# Patient Record
Sex: MALE | Race: WHITE | NOT HISPANIC OR LATINO | URBAN - METROPOLITAN AREA
[De-identification: names, ages, dates, MRNs, and addresses within clinical notes are randomized per-mention and may not be internally consistent; named-entity substitution may affect disease eponyms.]

---

## 2024-03-10 ENCOUNTER — EMERGENCY (EMERGENCY)
Facility: HOSPITAL | Age: 74
LOS: 1 days | Discharge: HOME | End: 2024-03-10
Attending: EMERGENCY MEDICINE | Admitting: EMERGENCY MEDICINE
Payer: MEDICARE

## 2024-03-10 VITALS
HEIGHT: 67 IN | HEART RATE: 78 BPM | TEMPERATURE: 98 F | DIASTOLIC BLOOD PRESSURE: 103 MMHG | SYSTOLIC BLOOD PRESSURE: 194 MMHG | OXYGEN SATURATION: 97 % | WEIGHT: 224.87 LBS | RESPIRATION RATE: 18 BRPM

## 2024-03-10 LAB
ALBUMIN SERPL ELPH-MCNC: 3.5 G/DL — SIGNIFICANT CHANGE UP (ref 3.3–5)
ALP SERPL-CCNC: 47 U/L — SIGNIFICANT CHANGE UP (ref 40–120)
ALT FLD-CCNC: 27 U/L — SIGNIFICANT CHANGE UP (ref 12–78)
ANION GAP SERPL CALC-SCNC: 7 MMOL/L — SIGNIFICANT CHANGE UP (ref 5–17)
AST SERPL-CCNC: 20 U/L — SIGNIFICANT CHANGE UP (ref 15–37)
BASOPHILS # BLD AUTO: 0.03 K/UL — SIGNIFICANT CHANGE UP (ref 0–0.2)
BASOPHILS NFR BLD AUTO: 0.4 % — SIGNIFICANT CHANGE UP (ref 0–2)
BILIRUB SERPL-MCNC: 0.4 MG/DL — SIGNIFICANT CHANGE UP (ref 0.2–1.2)
BUN SERPL-MCNC: 16 MG/DL — SIGNIFICANT CHANGE UP (ref 7–23)
CALCIUM SERPL-MCNC: 9.3 MG/DL — SIGNIFICANT CHANGE UP (ref 8.5–10.1)
CHLORIDE SERPL-SCNC: 112 MMOL/L — HIGH (ref 96–108)
CO2 SERPL-SCNC: 28 MMOL/L — SIGNIFICANT CHANGE UP (ref 22–31)
CREAT SERPL-MCNC: 1.3 MG/DL — SIGNIFICANT CHANGE UP (ref 0.5–1.3)
D DIMER BLD IA.RAPID-MCNC: 303 NG/ML DDU — HIGH
EGFR: 57 ML/MIN/1.73M2 — LOW
EOSINOPHIL # BLD AUTO: 0.19 K/UL — SIGNIFICANT CHANGE UP (ref 0–0.5)
EOSINOPHIL NFR BLD AUTO: 2.3 % — SIGNIFICANT CHANGE UP (ref 0–6)
GLUCOSE SERPL-MCNC: 138 MG/DL — HIGH (ref 70–99)
HCT VFR BLD CALC: 48.3 % — SIGNIFICANT CHANGE UP (ref 39–50)
HGB BLD-MCNC: 15.8 G/DL — SIGNIFICANT CHANGE UP (ref 13–17)
IMM GRANULOCYTES NFR BLD AUTO: 0.2 % — SIGNIFICANT CHANGE UP (ref 0–0.9)
LIDOCAIN IGE QN: 90 U/L — HIGH (ref 13–75)
LYMPHOCYTES # BLD AUTO: 2.81 K/UL — SIGNIFICANT CHANGE UP (ref 1–3.3)
LYMPHOCYTES # BLD AUTO: 34 % — SIGNIFICANT CHANGE UP (ref 13–44)
MAGNESIUM SERPL-MCNC: 2.1 MG/DL — SIGNIFICANT CHANGE UP (ref 1.6–2.6)
MCHC RBC-ENTMCNC: 28.7 PG — SIGNIFICANT CHANGE UP (ref 27–34)
MCHC RBC-ENTMCNC: 32.7 GM/DL — SIGNIFICANT CHANGE UP (ref 32–36)
MCV RBC AUTO: 87.7 FL — SIGNIFICANT CHANGE UP (ref 80–100)
MONOCYTES # BLD AUTO: 0.5 K/UL — SIGNIFICANT CHANGE UP (ref 0–0.9)
MONOCYTES NFR BLD AUTO: 6.1 % — SIGNIFICANT CHANGE UP (ref 2–14)
NEUTROPHILS # BLD AUTO: 4.71 K/UL — SIGNIFICANT CHANGE UP (ref 1.8–7.4)
NEUTROPHILS NFR BLD AUTO: 57 % — SIGNIFICANT CHANGE UP (ref 43–77)
NRBC # BLD: 0 /100 WBCS — SIGNIFICANT CHANGE UP (ref 0–0)
NT-PROBNP SERPL-SCNC: 85 PG/ML — SIGNIFICANT CHANGE UP (ref 0–450)
PLATELET # BLD AUTO: 251 K/UL — SIGNIFICANT CHANGE UP (ref 150–400)
POTASSIUM SERPL-MCNC: 3.8 MMOL/L — SIGNIFICANT CHANGE UP (ref 3.5–5.3)
POTASSIUM SERPL-SCNC: 3.8 MMOL/L — SIGNIFICANT CHANGE UP (ref 3.5–5.3)
PROT SERPL-MCNC: 7.2 G/DL — SIGNIFICANT CHANGE UP (ref 6–8.3)
RBC # BLD: 5.51 M/UL — SIGNIFICANT CHANGE UP (ref 4.2–5.8)
RBC # FLD: 13.9 % — SIGNIFICANT CHANGE UP (ref 10.3–14.5)
SODIUM SERPL-SCNC: 147 MMOL/L — HIGH (ref 135–145)
TROPONIN I, HIGH SENSITIVITY RESULT: 9.3 NG/L — SIGNIFICANT CHANGE UP
WBC # BLD: 8.26 K/UL — SIGNIFICANT CHANGE UP (ref 3.8–10.5)
WBC # FLD AUTO: 8.26 K/UL — SIGNIFICANT CHANGE UP (ref 3.8–10.5)

## 2024-03-10 PROCEDURE — 71275 CT ANGIOGRAPHY CHEST: CPT | Mod: 26,MC

## 2024-03-10 PROCEDURE — 71045 X-RAY EXAM CHEST 1 VIEW: CPT | Mod: 26

## 2024-03-10 PROCEDURE — 93010 ELECTROCARDIOGRAM REPORT: CPT

## 2024-03-10 PROCEDURE — 99285 EMERGENCY DEPT VISIT HI MDM: CPT

## 2024-03-10 PROCEDURE — 74177 CT ABD & PELVIS W/CONTRAST: CPT | Mod: 26,MC

## 2024-03-10 RX ORDER — DIPHENHYDRAMINE HCL 50 MG
50 CAPSULE ORAL ONCE
Refills: 0 | Status: COMPLETED | OUTPATIENT
Start: 2024-03-10 | End: 2024-03-10

## 2024-03-10 RX ORDER — ONDANSETRON 8 MG/1
4 TABLET, FILM COATED ORAL ONCE
Refills: 0 | Status: COMPLETED | OUTPATIENT
Start: 2024-03-10 | End: 2024-03-10

## 2024-03-10 RX ORDER — NITROGLYCERIN 6.5 MG
0.4 CAPSULE, EXTENDED RELEASE ORAL ONCE
Refills: 0 | Status: COMPLETED | OUTPATIENT
Start: 2024-03-10 | End: 2024-03-10

## 2024-03-10 RX ORDER — ASPIRIN/CALCIUM CARB/MAGNESIUM 324 MG
324 TABLET ORAL ONCE
Refills: 0 | Status: COMPLETED | OUTPATIENT
Start: 2024-03-10 | End: 2024-03-10

## 2024-03-10 RX ORDER — SODIUM CHLORIDE 9 MG/ML
1000 INJECTION INTRAMUSCULAR; INTRAVENOUS; SUBCUTANEOUS ONCE
Refills: 0 | Status: COMPLETED | OUTPATIENT
Start: 2024-03-10 | End: 2024-03-10

## 2024-03-10 RX ORDER — MORPHINE SULFATE 50 MG/1
2 CAPSULE, EXTENDED RELEASE ORAL ONCE
Refills: 0 | Status: DISCONTINUED | OUTPATIENT
Start: 2024-03-10 | End: 2024-03-10

## 2024-03-10 RX ORDER — ACETAMINOPHEN 500 MG
1000 TABLET ORAL ONCE
Refills: 0 | Status: COMPLETED | OUTPATIENT
Start: 2024-03-10 | End: 2024-03-10

## 2024-03-10 RX ADMIN — Medication 1000 MILLIGRAM(S): at 22:35

## 2024-03-10 RX ADMIN — Medication 50 MILLIGRAM(S): at 23:14

## 2024-03-10 RX ADMIN — Medication 125 MILLIGRAM(S): at 23:13

## 2024-03-10 RX ADMIN — Medication 0.4 MILLIGRAM(S): at 21:24

## 2024-03-10 RX ADMIN — ONDANSETRON 4 MILLIGRAM(S): 8 TABLET, FILM COATED ORAL at 21:43

## 2024-03-10 RX ADMIN — MORPHINE SULFATE 2 MILLIGRAM(S): 50 CAPSULE, EXTENDED RELEASE ORAL at 21:43

## 2024-03-10 RX ADMIN — SODIUM CHLORIDE 1000 MILLILITER(S): 9 INJECTION INTRAMUSCULAR; INTRAVENOUS; SUBCUTANEOUS at 23:14

## 2024-03-10 RX ADMIN — Medication 400 MILLIGRAM(S): at 22:20

## 2024-03-10 RX ADMIN — Medication 1000 MILLIGRAM(S): at 22:50

## 2024-03-10 RX ADMIN — Medication 324 MILLIGRAM(S): at 21:24

## 2024-03-10 RX ADMIN — MORPHINE SULFATE 2 MILLIGRAM(S): 50 CAPSULE, EXTENDED RELEASE ORAL at 22:13

## 2024-03-10 RX ADMIN — SODIUM CHLORIDE 1000 MILLILITER(S): 9 INJECTION INTRAMUSCULAR; INTRAVENOUS; SUBCUTANEOUS at 22:20

## 2024-03-10 NOTE — ED ADULT NURSE NOTE - OBJECTIVE STATEMENT
Pt p[resents to the ED s/p chest pressure, EKG done, pt placed on cardiac monitor, continuous pulse ox.

## 2024-03-10 NOTE — ED ADULT NURSE NOTE - CINV DISCH MEDS REVIEWED YN
Patient is having Chest Congestion Really bad he wants you to call in antibiotic for this. He is unable to come in due to work.  His no is [phone] Yes

## 2024-03-10 NOTE — ED PROVIDER NOTE - OBJECTIVE STATEMENT
74 yo M with hx of HTN presents c/o substernal chest pain x 2 hours. States the pain is non-radiating and steady. Pt reports he was laying down when the pain started. Pt did not take anything to alleviate the pain. Denies SOB. Denies prior hx of MI or known CAD. Denies N/V or abdominal pain. Denies LE pain or swelling.     Dr. Maldonado is his cardiologist.

## 2024-03-10 NOTE — ED ADULT NURSE NOTE - CADM POA PRESS ULCER
OB Provider reported that the vagina was inspected and no foreign body was found/Laps, needles and instrument count was correct No

## 2024-03-10 NOTE — ED PROVIDER NOTE - PATIENT PORTAL LINK FT
You can access the FollowMyHealth Patient Portal offered by [facility] by registering at the following website: [email or web address]. By joining [employer]’s FollowMyHealth portal, you will also be able to view your health information using other applications (apps) compatible with our system.

## 2024-03-10 NOTE — ED PROVIDER NOTE - PROGRESS NOTE DETAILS
Pt had small episode of vomiting. States pain is only a little better  SBP is better to 160s  Morphine and zofran ordered.  Will reassess. Labs reveiwed. Trop neg  Dimer elevated, CTA ordered  Pt re-evaluated, sitting up, looks better  States he feels better  Explained results to pt Pt returned from CT, c/o itching on head. Pt noted with hives on head and face. Denies tongue/lip swelling or SOB. Oropharynx is clear. Pt reports he has had IV contrast before without any adverse reaction. Will give benadryl and solumedrol and observe. Pt re-evalauted, states feels better  CTA results reviewed, no PE or dissection  Rpt trop pending  Pt reports he is a former smoker, smoked for 30 yrs but quit a long time ago  Will hold for cardio eval in AM Pt signed out to Dr. Gomes to follow cardio consult pt seen and cleared by cardio, follow up with dr velarde as outpt, return if any symptoms worsen

## 2024-03-10 NOTE — ED ADULT NURSE REASSESSMENT NOTE - NS ED NURSE REASSESS COMMENT FT1
2305: Pt returned back from the Ct scan with hives on his head and forehead c/o itching., Dr. Terry aware. pt denies difficulty breathing.

## 2024-03-10 NOTE — ED ADULT NURSE NOTE - NSFALLUNIVINTERV_ED_ALL_ED
Bed/Stretcher in lowest position, wheels locked, appropriate side rails in place/Call bell, personal items and telephone in reach/Instruct patient to call for assistance before getting out of bed/chair/stretcher/Non-slip footwear applied when patient is off stretcher/[place] to call system/Physically safe environment - no spills, clutter or unnecessary equipment/Purposeful proactive rounding/Room/bathroom lighting operational, light cord in reach

## 2024-03-10 NOTE — ED PROVIDER NOTE - CLINICAL SUMMARY MEDICAL DECISION MAKING FREE TEXT BOX
74 yo M with CP x 2 hours. Will evaluate for ACS, PE, PTX. Will get labs, EKG, CXR. Will give ASA 34 and SL NTG. Low suspicion for dissection as no radiation of  pain. Will closely monitor and reassess.

## 2024-03-11 VITALS
DIASTOLIC BLOOD PRESSURE: 60 MMHG | SYSTOLIC BLOOD PRESSURE: 130 MMHG | HEART RATE: 74 BPM | OXYGEN SATURATION: 98 % | RESPIRATION RATE: 16 BRPM

## 2024-03-11 LAB — TROPONIN I, HIGH SENSITIVITY RESULT: 10.1 NG/L — SIGNIFICANT CHANGE UP

## 2024-03-11 PROCEDURE — 71275 CT ANGIOGRAPHY CHEST: CPT | Mod: MC

## 2024-03-11 PROCEDURE — 93005 ELECTROCARDIOGRAM TRACING: CPT

## 2024-03-11 PROCEDURE — 83880 ASSAY OF NATRIURETIC PEPTIDE: CPT

## 2024-03-11 PROCEDURE — 83735 ASSAY OF MAGNESIUM: CPT

## 2024-03-11 PROCEDURE — 96361 HYDRATE IV INFUSION ADD-ON: CPT

## 2024-03-11 PROCEDURE — 93010 ELECTROCARDIOGRAM REPORT: CPT

## 2024-03-11 PROCEDURE — 71045 X-RAY EXAM CHEST 1 VIEW: CPT

## 2024-03-11 PROCEDURE — 96375 TX/PRO/DX INJ NEW DRUG ADDON: CPT | Mod: XU

## 2024-03-11 PROCEDURE — 85025 COMPLETE CBC W/AUTO DIFF WBC: CPT

## 2024-03-11 PROCEDURE — 80053 COMPREHEN METABOLIC PANEL: CPT

## 2024-03-11 PROCEDURE — 85379 FIBRIN DEGRADATION QUANT: CPT

## 2024-03-11 PROCEDURE — 96365 THER/PROPH/DIAG IV INF INIT: CPT | Mod: XU

## 2024-03-11 PROCEDURE — 84484 ASSAY OF TROPONIN QUANT: CPT

## 2024-03-11 PROCEDURE — 99285 EMERGENCY DEPT VISIT HI MDM: CPT | Mod: 25

## 2024-03-11 PROCEDURE — 83690 ASSAY OF LIPASE: CPT

## 2024-03-11 PROCEDURE — 74177 CT ABD & PELVIS W/CONTRAST: CPT | Mod: MC

## 2024-03-11 PROCEDURE — 36415 COLL VENOUS BLD VENIPUNCTURE: CPT

## 2024-03-11 RX ORDER — PANTOPRAZOLE SODIUM 20 MG/1
40 TABLET, DELAYED RELEASE ORAL ONCE
Refills: 0 | Status: COMPLETED | OUTPATIENT
Start: 2024-03-11 | End: 2024-03-11

## 2024-03-11 RX ADMIN — PANTOPRAZOLE SODIUM 40 MILLIGRAM(S): 20 TABLET, DELAYED RELEASE ORAL at 00:47

## 2024-03-11 NOTE — ED ADULT NURSE REASSESSMENT NOTE - AS PAIN REST
"[facility].   Chief Complaint   Patient presents with   • High Blood Sugar     Seen by pcp for same, sent from pcp office for n/o DM.   Has had about a week of nocturia and enuresis.   Patient awake, alert, developmentally appropriate for age. Skin pink, warm and dry, Respirations even and unlabored.   Patient to lobby pending call back. Advised to notify staff of any changes and or concerns.     /67   Pulse 80   Temp 37.2 °C (98.9 °F) (Temporal)   Resp 20   Ht 1.397 m (4' 7\")   Wt 30 kg (66 lb 2.2 oz)   BMI 15.37 kg/m²     " 0 (no pain/absence of nonverbal indicators of pain)

## 2024-03-11 NOTE — CONSULT NOTE ADULT - ASSESSMENT
Pt is a 74 y/o M with PMHx of HTN, MVP who presented to the ED c/o substernal chest pain.    #HTN    - No clear evidence of acute ischemia, trops neg x2  - EKG NSR @ 74 without ischemic changes  - s/p  x1, sublingual nitroglycerin  - No meaningful evidence of volume overload, bnp neg  - CTA neg for PE  - /103 on arrival, now improved to 134/76  - Would recommend increasing Enalapril 5mg to 10mg daily  - Recommend outpatient follow up with Dr. Maldonado for TTE and stress testing             72 y/o M with PMHx of HTN, MVP who presented to the ED c/o substernal chest pain.         - No clear evidence of acute ischemia, trops neg x2  - EKG NSR @ 74 without ischemic changes  - s/p  x1, sublingual nitroglycerin  - No meaningful evidence of volume overload, bnp neg  - CTA neg for PE  - /103 on arrival, now improved to 134/76  - Would recommend increasing Enalapril 5mg to 10mg daily  - Recommend outpatient follow up with Dr. Maldonado for TTE and stress testing

## 2024-03-11 NOTE — CONSULT NOTE ADULT - ATTENDING COMMENTS
74 y/o M with PMHx of HTN, MVP who presented to the ED c/o substernal chest pain.    The patient has symptoms of atypical chest pain. The patient's chest pain is unlikely related to an acute coronary syndrome.  The EKG does not have any ischemic changes.  troponins neg. No signs of volume overload. BP control. can increase enalapril 10mg Qday  At this point if all of ED workup is negative, then the patient can be discharged from a cardiac standpoint and will followup as an outpt for further cardiac testing.

## 2024-03-11 NOTE — CONSULT NOTE ADULT - SUBJECTIVE AND OBJECTIVE BOX
Patient is a 73y old  Male who presents with a chief complaint of Chest pain    HPI: Pt is a 74 y/o M with PMHx of HTN, MVP who presented to the ED c/o substernal chest pain that began after dinner. Pt ate dinner then was sitting on the couch when he began to have discomfort in the middle of his chest above his stomach. The pain did not radiate anywhere and he was not experiencing other symptoms. Pt got up and walked around to try to relieve the pain however it did not go away, and he began to feel hot and sweaty so he came to the ED to be evaluated. Pt reports pain was 8/10 initially, received aspirin 324 x1 and sublingual nitroglycerin x1. Pain gradually improved throughout the night and pt currently reports no pain. He denies any current symptoms including dizziness, lightheadedness, blurry vision, chest pain, palpitations, SOB, AP, n/v/d/c. No history of chest pain. Follows with Dr. Maldonado cardiology however has not seen him in approx. 3 years.        MEDICATIONS  (STANDING):    MEDICATIONS  (PRN):      FAMILY HISTORY:    Denies Family history of CAD or early MI    ROS:  Constitutional: denies fever, chills  HEENT: denies blurry vision  Respiratory: denies SOB, SMITH, cough  Cardiovascular: denies CP, palpitations, orthopnea, PND, LE edema  Gastrointestinal: denies nausea, vomiting, abdominal pain  Neurologic: denies headache, weakness, dizziness  ROS negative except as noted above      SOCIAL HISTORY:    No tobacco, Alcohol or Ddrug use    Vital Signs Last 24 Hrs  T(C): 36.7 (11 Mar 2024 08:18), Max: 36.8 (11 Mar 2024 04:51)  T(F): 98 (11 Mar 2024 08:18), Max: 98.3 (11 Mar 2024 04:51)  HR: 72 (11 Mar 2024 08:18) (65 - 78)  BP: 146/87 (11 Mar 2024 08:18) (134/76 - 194/103)  BP(mean): --  RR: 17 (11 Mar 2024 08:18) (16 - 18)  SpO2: 94% (11 Mar 2024 08:18) (94% - 97%)    Parameters below as of 11 Mar 2024 08:18  Patient On (Oxygen Delivery Method): room air        Physical Exam:  General: Well developed, well nourished, NAD  HEENT: NCAT, EOMI bl, moist mucous membranes   Neurology: answers questions and follows commands appropriately  Respiratory: CTA B/L, No W/R/R  CV: RRR, +S1/S2, no murmurs, rubs or gallops  Abdominal: Soft, NT, ND  Extremities: No C/C/E, + peripheral pulses      ECG: NSR @ 74bpm    I&O's Detail      LABS:                        15.8   8.26  )-----------( 251      ( 10 Mar 2024 21:20 )             48.3     03-10    147<H>  |  112<H>  |  16  ----------------------------<  138<H>  3.8   |  28  |  1.30    Ca    9.3      10 Mar 2024 21:20  Mg     2.1     03-10    TPro  7.2  /  Alb  3.5  /  TBili  0.4  /  DBili  x   /  AST  20  /  ALT  27  /  AlkPhos  47  03-10          Urinalysis Basic - ( 10 Mar 2024 21:20 )    Color: x / Appearance: x / SG: x / pH: x  Gluc: 138 mg/dL / Ketone: x  / Bili: x / Urobili: x   Blood: x / Protein: x / Nitrite: x   Leuk Esterase: x / RBC: x / WBC x   Sq Epi: x / Non Sq Epi: x / Bacteria: x      I&O's Summary    BNP  RADIOLOGY & ADDITIONAL STUDIES: Patient is a 73y old  Male who presents with a chief complaint of Chest pain    HPI: Pt is a 74 y/o M with PMHx of HTN, MVP who presented to the ED c/o substernal chest pain that began after dinner. Pt ate dinner then was sitting on the couch when he began to have discomfort in the middle of his chest above his stomach. The pain did not radiate anywhere and he was not experiencing other symptoms. Pt got up and walked around to try to relieve the pain however it did not go away, and he began to feel hot and sweaty so he came to the ED to be evaluated. Pt reports pain was 8/10 initially, received aspirin 324 x1 and sublingual nitroglycerin x1. Pain gradually improved throughout the night and pt currently reports no pain. He denies any current symptoms including dizziness, lightheadedness, blurry vision, chest pain, palpitations, SOB, AP, n/v/d/c. No history of chest pain. Follows with Dr. Maldonado cardiology however has not seen him in approx. 3 years.        MEDICATIONS  (STANDING):    MEDICATIONS  (PRN):      FAMILY HISTORY:    Denies Family history of CAD or early MI    ROS:    ROS negative except as noted above      SOCIAL HISTORY:    No tobacco, Alcohol or Ddrug use    Vital Signs Last 24 Hrs  T(C): 36.7 (11 Mar 2024 08:18), Max: 36.8 (11 Mar 2024 04:51)  T(F): 98 (11 Mar 2024 08:18), Max: 98.3 (11 Mar 2024 04:51)  HR: 72 (11 Mar 2024 08:18) (65 - 78)  BP: 146/87 (11 Mar 2024 08:18) (134/76 - 194/103)  BP(mean): --  RR: 17 (11 Mar 2024 08:18) (16 - 18)  SpO2: 94% (11 Mar 2024 08:18) (94% - 97%)    Parameters below as of 11 Mar 2024 08:18  Patient On (Oxygen Delivery Method): room air        Physical Exam:  General: Well developed, well nourished, NAD  HEENT: NCAT, EOMI bl, moist mucous membranes   Neurology: answers questions and follows commands appropriately  Respiratory: CTA B/L, No W/R/R  CV: RRR, +S1/S2, no murmurs, rubs or gallops  Abdominal: Soft, NT, ND  Extremities: No C/C/E, + peripheral pulses  psych appropriate mood and affect       ECG: NSR @ 74bpm    I&O's Detail      LABS:                        15.8   8.26  )-----------( 251      ( 10 Mar 2024 21:20 )             48.3     03-10    147<H>  |  112<H>  |  16  ----------------------------<  138<H>  3.8   |  28  |  1.30    Ca    9.3      10 Mar 2024 21:20  Mg     2.1     03-10    TPro  7.2  /  Alb  3.5  /  TBili  0.4  /  DBili  x   /  AST  20  /  ALT  27  /  AlkPhos  47  03-10          Urinalysis Basic - ( 10 Mar 2024 21:20 )    Color: x / Appearance: x / SG: x / pH: x  Gluc: 138 mg/dL / Ketone: x  / Bili: x / Urobili: x   Blood: x / Protein: x / Nitrite: x   Leuk Esterase: x / RBC: x / WBC x   Sq Epi: x / Non Sq Epi: x / Bacteria: x      I&O's Summary    BNP  RADIOLOGY & ADDITIONAL STUDIES:

## 2024-03-25 NOTE — ED ADULT NURSE NOTE - NS ED NURSE RECORD ANOTHER HT AND WT
Received message from [facility] advising that they have not been able to reach patient to schedule Fas[facility] delivery.     Left message for patient advising of the above and provided number to [facility] to call and schedule delivery.   Yes Ear Star Wedge Flap Text: The defect edges were debeveled with a #15 blade scalpel.  Given the location of the defect and the proximity to free margins (helical rim) an ear star wedge flap was deemed most appropriate.  Using a sterile surgical marker, the appropriate flap was drawn incorporating the defect and placing the expected incisions between the helical rim and antihelix where possible.  The area thus outlined was incised through and through with a #15 scalpel blade.

## 2024-08-02 ENCOUNTER — INPATIENT (INPATIENT)
Facility: HOSPITAL | Age: 74
LOS: 0 days | Discharge: HOME | DRG: 394 | End: 2024-08-02
Attending: STUDENT IN AN ORGANIZED HEALTH CARE EDUCATION/TRAINING PROGRAM | Admitting: STUDENT IN AN ORGANIZED HEALTH CARE EDUCATION/TRAINING PROGRAM
Payer: MEDICARE

## 2024-08-02 VITALS
HEART RATE: 60 BPM | HEIGHT: 67 IN | SYSTOLIC BLOOD PRESSURE: 153 MMHG | OXYGEN SATURATION: 99 % | TEMPERATURE: 98 F | DIASTOLIC BLOOD PRESSURE: 91 MMHG | WEIGHT: 214.95 LBS | RESPIRATION RATE: 16 BRPM

## 2024-08-02 VITALS
SYSTOLIC BLOOD PRESSURE: 145 MMHG | DIASTOLIC BLOOD PRESSURE: 80 MMHG | HEART RATE: 83 BPM | RESPIRATION RATE: 15 BRPM | OXYGEN SATURATION: 97 %

## 2024-08-02 DIAGNOSIS — T18.128A FOOD IN ESOPHAGUS CAUSING OTHER INJURY, INITIAL ENCOUNTER: ICD-10-CM

## 2024-08-02 LAB
ALBUMIN SERPL ELPH-MCNC: 4.3 G/DL — SIGNIFICANT CHANGE UP (ref 3.3–5)
ALP SERPL-CCNC: 51 U/L — SIGNIFICANT CHANGE UP (ref 40–120)
ALT FLD-CCNC: 28 U/L — SIGNIFICANT CHANGE UP (ref 12–78)
ANION GAP SERPL CALC-SCNC: 11 MMOL/L — SIGNIFICANT CHANGE UP (ref 5–17)
APTT BLD: 33.6 SEC — SIGNIFICANT CHANGE UP (ref 24.5–35.6)
AST SERPL-CCNC: 26 U/L — SIGNIFICANT CHANGE UP (ref 15–37)
BASOPHILS # BLD AUTO: 0.04 K/UL — SIGNIFICANT CHANGE UP (ref 0–0.2)
BASOPHILS NFR BLD AUTO: 0.4 % — SIGNIFICANT CHANGE UP (ref 0–2)
BILIRUB SERPL-MCNC: 0.4 MG/DL — SIGNIFICANT CHANGE UP (ref 0.2–1.2)
BUN SERPL-MCNC: 21 MG/DL — SIGNIFICANT CHANGE UP (ref 7–23)
CALCIUM SERPL-MCNC: 10 MG/DL — SIGNIFICANT CHANGE UP (ref 8.5–10.1)
CHLORIDE SERPL-SCNC: 105 MMOL/L — SIGNIFICANT CHANGE UP (ref 96–108)
CO2 SERPL-SCNC: 25 MMOL/L — SIGNIFICANT CHANGE UP (ref 22–31)
CREAT SERPL-MCNC: 1.4 MG/DL — HIGH (ref 0.5–1.3)
EGFR: 52 ML/MIN/1.73M2 — LOW
EOSINOPHIL # BLD AUTO: 0.08 K/UL — SIGNIFICANT CHANGE UP (ref 0–0.5)
EOSINOPHIL NFR BLD AUTO: 0.8 % — SIGNIFICANT CHANGE UP (ref 0–6)
GLUCOSE SERPL-MCNC: 127 MG/DL — HIGH (ref 70–99)
HCT VFR BLD CALC: 52 % — HIGH (ref 39–50)
HGB BLD-MCNC: 16.7 G/DL — SIGNIFICANT CHANGE UP (ref 13–17)
IMM GRANULOCYTES NFR BLD AUTO: 0.5 % — SIGNIFICANT CHANGE UP (ref 0–0.9)
INR BLD: 0.96 RATIO — SIGNIFICANT CHANGE UP (ref 0.85–1.18)
LYMPHOCYTES # BLD AUTO: 1.05 K/UL — SIGNIFICANT CHANGE UP (ref 1–3.3)
LYMPHOCYTES # BLD AUTO: 10.5 % — LOW (ref 13–44)
MCHC RBC-ENTMCNC: 28.4 PG — SIGNIFICANT CHANGE UP (ref 27–34)
MCHC RBC-ENTMCNC: 32.1 GM/DL — SIGNIFICANT CHANGE UP (ref 32–36)
MCV RBC AUTO: 88.6 FL — SIGNIFICANT CHANGE UP (ref 80–100)
MONOCYTES # BLD AUTO: 0.43 K/UL — SIGNIFICANT CHANGE UP (ref 0–0.9)
MONOCYTES NFR BLD AUTO: 4.3 % — SIGNIFICANT CHANGE UP (ref 2–14)
NEUTROPHILS # BLD AUTO: 8.32 K/UL — HIGH (ref 1.8–7.4)
NEUTROPHILS NFR BLD AUTO: 83.5 % — HIGH (ref 43–77)
NRBC # BLD: 0 /100 WBCS — SIGNIFICANT CHANGE UP (ref 0–0)
PLATELET # BLD AUTO: 260 K/UL — SIGNIFICANT CHANGE UP (ref 150–400)
POTASSIUM SERPL-MCNC: 4.2 MMOL/L — SIGNIFICANT CHANGE UP (ref 3.5–5.3)
POTASSIUM SERPL-SCNC: 4.2 MMOL/L — SIGNIFICANT CHANGE UP (ref 3.5–5.3)
PROT SERPL-MCNC: 8 G/DL — SIGNIFICANT CHANGE UP (ref 6–8.3)
PROTHROM AB SERPL-ACNC: 11.2 SEC — SIGNIFICANT CHANGE UP (ref 9.5–13)
RBC # BLD: 5.87 M/UL — HIGH (ref 4.2–5.8)
RBC # FLD: 13.9 % — SIGNIFICANT CHANGE UP (ref 10.3–14.5)
SODIUM SERPL-SCNC: 141 MMOL/L — SIGNIFICANT CHANGE UP (ref 135–145)
WBC # BLD: 9.97 K/UL — SIGNIFICANT CHANGE UP (ref 3.8–10.5)
WBC # FLD AUTO: 9.97 K/UL — SIGNIFICANT CHANGE UP (ref 3.8–10.5)

## 2024-08-02 PROCEDURE — 80053 COMPREHEN METABOLIC PANEL: CPT

## 2024-08-02 PROCEDURE — 71045 X-RAY EXAM CHEST 1 VIEW: CPT | Mod: 26

## 2024-08-02 PROCEDURE — 99285 EMERGENCY DEPT VISIT HI MDM: CPT | Mod: FS

## 2024-08-02 PROCEDURE — 43247 EGD REMOVE FOREIGN BODY: CPT

## 2024-08-02 PROCEDURE — 36415 COLL VENOUS BLD VENIPUNCTURE: CPT

## 2024-08-02 PROCEDURE — 85730 THROMBOPLASTIN TIME PARTIAL: CPT

## 2024-08-02 PROCEDURE — 96375 TX/PRO/DX INJ NEW DRUG ADDON: CPT

## 2024-08-02 PROCEDURE — 86900 BLOOD TYPING SEROLOGIC ABO: CPT

## 2024-08-02 PROCEDURE — 86850 RBC ANTIBODY SCREEN: CPT

## 2024-08-02 PROCEDURE — 99285 EMERGENCY DEPT VISIT HI MDM: CPT | Mod: 25

## 2024-08-02 PROCEDURE — 96374 THER/PROPH/DIAG INJ IV PUSH: CPT

## 2024-08-02 PROCEDURE — 71045 X-RAY EXAM CHEST 1 VIEW: CPT

## 2024-08-02 PROCEDURE — 85025 COMPLETE CBC W/AUTO DIFF WBC: CPT

## 2024-08-02 PROCEDURE — 86901 BLOOD TYPING SEROLOGIC RH(D): CPT

## 2024-08-02 PROCEDURE — 85610 PROTHROMBIN TIME: CPT

## 2024-08-02 PROCEDURE — 99221 1ST HOSP IP/OBS SF/LOW 40: CPT

## 2024-08-02 RX ORDER — DEXTROSE MONOHYDRATE, SODIUM CHLORIDE, SODIUM LACTATE, CALCIUM CHLORIDE, MAGNESIUM CHLORIDE 1.5; 538; 448; 18.4; 5.08 G/100ML; MG/100ML; MG/100ML; MG/100ML; MG/100ML
1000 SOLUTION INTRAPERITONEAL
Refills: 0 | Status: DISCONTINUED | OUTPATIENT
Start: 2024-08-02 | End: 2024-08-02

## 2024-08-02 RX ORDER — MAGNESIUM, ALUMINUM HYDROXIDE 200-225/5
30 SUSPENSION, ORAL (FINAL DOSE FORM) ORAL EVERY 4 HOURS
Refills: 0 | Status: DISCONTINUED | OUTPATIENT
Start: 2024-08-02 | End: 2024-08-02

## 2024-08-02 RX ORDER — PANTOPRAZOLE SODIUM 20 MG/1
1 TABLET, DELAYED RELEASE ORAL
Qty: 60 | Refills: 0
Start: 2024-08-02

## 2024-08-02 RX ORDER — ONDANSETRON HCL/PF 4 MG/2 ML
4 VIAL (ML) INJECTION ONCE
Refills: 0 | Status: COMPLETED | OUTPATIENT
Start: 2024-08-02 | End: 2024-08-02

## 2024-08-02 RX ORDER — HYDROMORPHONE HCL IN 0.9% NACL 0.2 MG/ML
0.5 PLASTIC BAG, INJECTION (ML) INTRAVENOUS
Refills: 0 | Status: DISCONTINUED | OUTPATIENT
Start: 2024-08-02 | End: 2024-08-02

## 2024-08-02 RX ORDER — MELATONIN 3 MG
3 TABLET ORAL AT BEDTIME
Refills: 0 | Status: DISCONTINUED | OUTPATIENT
Start: 2024-08-02 | End: 2024-08-02

## 2024-08-02 RX ORDER — GLUCAGON INJECTION, SOLUTION 0.5 MG/.1ML
1 INJECTION, SOLUTION SUBCUTANEOUS ONCE
Refills: 0 | Status: COMPLETED | OUTPATIENT
Start: 2024-08-02 | End: 2024-08-02

## 2024-08-02 RX ORDER — BACTERIOSTATIC SODIUM CHLORIDE 0.9 %
1000 VIAL (ML) INJECTION ONCE
Refills: 0 | Status: COMPLETED | OUTPATIENT
Start: 2024-08-02 | End: 2024-08-02

## 2024-08-02 RX ORDER — ACETAMINOPHEN 500 MG
650 TABLET ORAL EVERY 6 HOURS
Refills: 0 | Status: DISCONTINUED | OUTPATIENT
Start: 2024-08-02 | End: 2024-08-02

## 2024-08-02 RX ORDER — ENALAPRIL MALEATE 10 MG/1
1 TABLET ORAL
Refills: 0 | DISCHARGE

## 2024-08-02 RX ORDER — ACETAMINOPHEN 500 MG
1000 TABLET ORAL ONCE
Refills: 0 | Status: DISCONTINUED | OUTPATIENT
Start: 2024-08-02 | End: 2024-08-02

## 2024-08-02 RX ORDER — ONDANSETRON HCL/PF 4 MG/2 ML
4 VIAL (ML) INJECTION EVERY 8 HOURS
Refills: 0 | Status: DISCONTINUED | OUTPATIENT
Start: 2024-08-02 | End: 2024-08-02

## 2024-08-02 RX ORDER — ONDANSETRON HCL/PF 4 MG/2 ML
4 VIAL (ML) INJECTION ONCE
Refills: 0 | Status: DISCONTINUED | OUTPATIENT
Start: 2024-08-02 | End: 2024-08-02

## 2024-08-02 RX ADMIN — GLUCAGON INJECTION, SOLUTION 1 MILLIGRAM(S): 0.5 INJECTION, SOLUTION SUBCUTANEOUS at 16:10

## 2024-08-02 RX ADMIN — Medication 1000 MILLILITER(S): at 16:11

## 2024-08-02 RX ADMIN — Medication 4 MILLIGRAM(S): at 16:10

## 2024-08-02 NOTE — DISCHARGE NOTE PROVIDER - CARE PROVIDER_API CALL
Nolberto Sanchez  [address], [address]  [place] [address]  Phone: [phone]  Fax: [phone]  Follow Up Time:     Hector Pierre  Gastroenterology  [address] [place] [id number]  Phone: [phone]  Fax: [phone]  Follow Up Time:

## 2024-08-02 NOTE — ED ADULT TRIAGE NOTE - BIRTH SEX
Male
I personally performed the service described in the documentation recorded by the scribe in my presence, and it accurately and completely records my words and actions.

## 2024-08-02 NOTE — DISCHARGE NOTE PROVIDER - NSDCFUADDINST_GEN_ALL_CORE_FT
DC home today  Rx Protonix 40mg BID  soft regular diet  follow up with GI outpatient  continue home meds  f/u PCP within 1 week

## 2024-08-02 NOTE — DISCHARGE NOTE PROVIDER - ATTENDING DISCHARGE PHYSICAL EXAMINATION:
T(C): 36.7 (08-02-24 @ 21:31), Max: 36.9 (08-02-24 @ 14:26)  HR: 84 (08-02-24 @ 21:31) (60 - 94)  BP: 147/83 (08-02-24 @ 21:31) (113/83 - 161/101)  RR: 17 (08-02-24 @ 21:31) (15 - 18)  SpO2: 98% (08-02-24 @ 21:31) (96% - 100%)    General: No apparent distress  Head: normocephalic, atraumatic  Eyes: EOMI, anicteric  ENT: moist mucous membranes, no pharyngeal exudates  Heart: rrr, S1, S2, no murmurs  Chest: CTA b/l, no rales, rhonchi, or wheezes  Abd: BS+, soft, NT, ND  Extr: no edema or cyanosis  Neuro: AA&Ox3, no focal weakness, sensation to light touch intact  Psych: normal affect

## 2024-08-02 NOTE — ED PROVIDER NOTE - PROGRESS NOTE DETAILS
Labs no acute findings glucagon did not help symptoms still spitting up not tolerating p.o. spoke with GI Dr. Burleson advised admission for EGD

## 2024-08-02 NOTE — DISCHARGE NOTE PROVIDER - NSDCMRMEDTOKEN_GEN_ALL_CORE_FT
enalapril 5 mg oral tablet: 1 tab(s) orally once a day  Protonix 40 mg oral delayed release tablet: 1 tab(s) orally 2 times a day

## 2024-08-02 NOTE — ED PROVIDER NOTE - OBJECTIVE STATEMENT
73-year-old male with past medical history of mitral valve prolapse hypertension coming in for possible food bolus impaction.  Patient states he was eating steak around 1:00 today got stuck in his throat and spit up some frothy particles but still felt it so came to emergency room.  Patient states he also vomited denies any blood in the vomit.  Patient denies any fever chills chest pain shortness of breath.  Patient states he feels like it stuck in his chest now.

## 2024-08-02 NOTE — ED ADULT NURSE NOTE - OBJECTIVE STATEMENT
aox3, presents with foreign body sensation in throat. speaking in clear sentences. denies chest pain, sob, n/v.  pending provider eval. aox3, presents with foreign body sensation in throat. speaking in clear sentences.  pt actively dry heaving after attempting to swallowing fluids.   denies chest pain, sob, n/v.  pending provider eval.

## 2024-08-02 NOTE — H&P ADULT - NSHPPHYSICALEXAM_GEN_ALL_CORE
T(C): 36.8 (08-02-24 @ 18:52), Max: 36.9 (08-02-24 @ 14:26)  HR: 68 (08-02-24 @ 18:52) (60 - 92)  BP: 161/101 (08-02-24 @ 18:52) (153/91 - 161/101)  RR: 16 (08-02-24 @ 18:52) (16 - 16)  SpO2: 98% (08-02-24 @ 18:52) (96% - 99%)    General: No apparent distress  Head: normocephalic, atraumatic  Eyes: EOMI, anicteric  ENT: moist mucous membranes, no pharyngeal exudates  Heart: rrr, S1, S2, no murmurs  Chest: CTA b/l, no rales, rhonchi, or wheezes  Abd: BS+, soft, NT, ND  Extr: no edema or cyanosis  Neuro: AA&Ox3, no focal weakness, sensation to light touch intact  Psych: normal affect

## 2024-08-02 NOTE — ASU DISCHARGE PLAN (ADULT/PEDIATRIC) - NS MD DC FALL RISK RISK
For information on Fall & Injury Prevention, visit: [email or web address] OR  [email or web address] OR  https://www.cdc.gov/steadi/patient.html

## 2024-08-02 NOTE — CHART NOTE - NSCHARTNOTEFT_GEN_A_CORE
s/p  upper gastrointestinal endoscopy    large amount of food bolus removed from distal esophagus  small shatzki ring noted  small ulceration noted at site of food impaction    rec:   dc home  proton pump inhibitor bid  outpatient follow up

## 2024-08-02 NOTE — PRE-OP CHECKLIST - RESPIRATORY RATE (BREATHS/MIN)
Discharge instruction to follow: Activity: Pelvis rest for 6 weeks     No heavy lifting over 15 lbs for 2 weeks     No driving for 2 weeks     No push/pull motion such as sweeping or vacuuming for 2 weeks     No tub baths for 6 weeks     section keep incision clean and dry, may shower as normal with soap and water. Inspect incision every day for signs of infection listed below. Continue to use rosario-bottle with every void or bowel movement until comfortable stopping. Change sanitary pad after each urination or bowel movement. Call MD for the following:      Fever over 101 F; pain not relieved by medication; foul smelling vaginal discharge or increase in vaginal bleeding. Redness, swelling, or drainage from  incision. Take medication as prescribed. Follow up with MD as order.  Section: What to Expect at [address] [place] [address]    A  section, or , is surgery to deliver your baby through a cut, called an incision, that the doctor makes in your lower belly and uterus. You may have some pain in your lower belly and need pain medicine for 1 to 2 weeks. You can expect some vaginal bleeding for several weeks. You will probably need about 6 weeks to fully recover. It is important to take it easy while the incision is healing. Avoid heavy lifting, strenuous activities, or exercises that strain the belly muscles while you are recovering. Ask a family member or friend for help with housework, cooking, and shopping. This care sheet gives you a general idea about how long it will take for you to recover. But each person recovers at a different pace. Follow the steps below to get better as quickly as possible. How can you care for yourself at home? Activity    · Rest when you feel tired. Getting enough sleep will help you recover.     · Try to walk each day. Start by walking a little more than you did the day before. Bit by bit, increase the amount you walk.  Walking boosts blood flow and helps prevent pneumonia, constipation, and blood clots.     · Avoid strenuous activities, such as bicycle riding, jogging, weightlifting, and aerobic exercise, for 6 weeks or until your doctor says it is okay.     · Until your doctor says it is okay, do not lift anything heavier than your baby.     · Do not do sit-ups or other exercises that strain the belly muscles for 6 weeks or until your doctor says it is okay.     · Hold a pillow over your incision when you cough or take deep breaths. This will support your belly and decrease your pain.     · You may shower as usual. Pat the incision dry when you are done.     · You will have some vaginal bleeding. Wear sanitary pads. Do not douche or use tampons until your doctor says it is okay.     · Ask your doctor when you can drive again.     · You will probably need to take at least 6 weeks off work. It depends on the type of work you do and how you feel.     · Ask your doctor when it is okay for you to have sex. Diet    · You can eat your normal diet. If your stomach is upset, try bland, low-fat foods like plain rice, broiled chicken, toast, and yogurt.     · Drink plenty of fluids (unless your doctor tells you not to).     · You may notice that your bowel movements are not regular right after your surgery. This is common. Try to avoid constipation and straining with bowel movements. You may want to take a fiber supplement every day. If you have not had a bowel movement after a couple of days, ask your doctor about taking a mild laxative.     · If you are breastfeeding, do not drink any alcohol. Medicines    · Your doctor will tell you if and when you can restart your medicines. He or she will also give you instructions about taking any new medicines.     · If you take blood thinners, such as warfarin (Coumadin), clopidogrel (Plavix), or aspirin, be sure to talk to your doctor.  He or she will tell you if and when to start taking those medicines again. Make sure that you understand exactly what your doctor wants you to do.     · Take pain medicines exactly as directed. ¨ If the doctor gave you a prescription medicine for pain, take it as prescribed. ¨ If you are not taking a prescription pain medicine, ask your doctor if you can take an over-the-counter medicine.     · If you think your pain medicine is making you sick to your stomach:  ¨ Take your medicine after meals (unless your doctor has told you not to). ¨ Ask your doctor for a different pain medicine.     · If your doctor prescribed antibiotics, take them as directed. Do not stop taking them just because you feel better. You need to take the full course of antibiotics. Incision care    · If you have strips of tape on the incision, leave the tape on for a week or until it falls off.     · Wash the area daily with warm, soapy water, and pat it dry. Don't use hydrogen peroxide or alcohol, which can slow healing. You may cover the area with a gauze bandage if it weeps or rubs against clothing. Change the bandage every day.     · Keep the area clean and dry. Other instructions    · If you breastfeed your baby, you may be more comfortable while you are healing if you place the baby so that he or she is not resting on your belly. Try tucking your baby under your arm, with his or her body along the side you will be feeding on. Support your baby's upper body with your arm. With that hand you can control your baby's head to bring his or her mouth to your breast. This is sometimes called the football hold. Follow-up care is a key part of your treatment and safety. Be sure to make and go to all appointments, and call your doctor if you are having problems. It's also a good idea to know your test results and keep a list of the medicines you take. When should you call for help? Call 911 anytime you think you may need emergency care. For example, call if:    · You passed out (lost consciousness).      · You have chest pain, are short of breath, or cough up blood.    Call your doctor now or seek immediate medical care if:    · You have pain that does not get better after you take pain medicine.     · You have severe vaginal bleeding.     · You are dizzy or lightheaded, or you feel like you may faint.     · You have new or worse pain in your belly or pelvis.     · You have loose stitches, or your incision comes open.     · You have symptoms of infection, such as:  ¨ Increased pain, swelling, warmth, or redness. ¨ Red streaks leading from the incision. ¨ Pus draining from the incision. ¨ A fever.     · You have symptoms of a blood clot in your leg (called a deep vein thrombosis), such as:  ¨ Pain in your calf, back of the knee, thigh, or groin. ¨ Redness and swelling in your leg or groin.    Watch closely for changes in your health, and be sure to contact your doctor if:    · You do not get better as expected. Where can you learn more? Go to http://renetta-candida.info/. Enter M806 in the search box to learn more about \" Section: What to Expect at Home. \"  Current as of: 2017  Content Version: 11.7  © 5605-6812 [employer]. Care instructions adapted under license by [employer] (which disclaims liability or warranty for this information). If you have questions about a medical condition or this instruction, always ask your healthcare professional. [place] [address] any warranty or liability for your use of this information. 16

## 2024-08-02 NOTE — ED ADULT TRIAGE NOTE - CHIEF COMPLAINT QUOTE
pt ambulated into triage without difficulty states "I think a piece of food is stuck, I can feel it when I swallow water." states last ate 45 minutes. hx of HTN

## 2024-08-02 NOTE — CONSULT NOTE ADULT - ASSESSMENT
food impaction    plan for urgent EGD to remove food bolus  npo  r/b/a including but not limited to bleeding, infection, perforation requiring surgery all discussed  consent in chart

## 2024-08-02 NOTE — ED ADULT NURSE REASSESSMENT NOTE - NS ED NURSE REASSESS COMMENT FT1
per OR RN Ciarra, ready for pt.  pt states family will  belongings in 20 minutes.   pt in hospital gown, belongings bagged.
pt attempted to swallow carbonated fluids.  unable to without dry heaving.  pt sitting in high fowlers.
Pt in route to OR. No s/s of distress, aox4 son is at bedside.

## 2024-08-02 NOTE — H&P ADULT - ASSESSMENT
Food Bolus Impaction  Admit to medicine  f/u GI recs  EGD intervention by GI  Continue home meds  swallow eval if still NPO  Zofran PRN    DVT ppx: SCDs  Dispo: home with Protonix BID and soft diet     Patient is a 74yo M, PMH HTN, presents to ED with food bolus impaction. Pt to go to OR for EGD and disimpaction.      Food Bolus Impaction  Admit to medicine  f/u GI recs  EGD intervention by GI  Continue home meds  swallow eval if still NPO  Zofran PRN    HTN  Continue Enalapril 5mg daily with hold parameters    DVT ppx: SCDs  Dispo: home with Protonix BID and soft diet     Patient is a 72yo M, PMH HTN, presents to ED with food bolus impaction. Pt to go to OR for EGD and disimpaction.      Food Bolus Impaction  Admit to medicine  f/u GI recs  EGD intervention by GI  Continue home meds  swallow eval if still NPO  Zofran PRN    HTN  Continue Enalapril 5mg daily with hold parameters    DVT ppx: SCDs  Dispo: home with Protonix BID and soft diet    time spent 45 min

## 2024-08-02 NOTE — ED PROVIDER NOTE - NS ED ATTENDING STATEMENT MOD
Is This A New Presentation, Or A Follow-Up?: Acne
Additional Comments (Use Complete Sentences): .\\n\\n\\n\\nRhodan & fields for acne\\nmineral makeup - bare minerals\\n\\nretin-a occasionally - last time over 2 weeks ago
This was a shared visit with the KAT. I reviewed and verified the documentation.

## 2024-08-02 NOTE — H&P ADULT - HISTORY OF PRESENT ILLNESS
Patient is a 72yo M, PMH   presents to ED with food bolus impaction after eating steak earlier today. ED tried glucagon and trial oral diet, but patient did not tolerate. He denies SOB, abdominal pain, nausea, vomiting, chills, fever.  He is NPO for EGD by GI.   Patient is a 72yo M, PMH HTN, presents to ED with food bolus impaction after eating steak earlier today. ED tried glucagon and trial oral diet, but patient did not tolerate. He denies SOB, abdominal pain, nausea, vomiting, chills, fever.  He is NPO for EGD by GI.

## 2024-08-02 NOTE — ED PROVIDER NOTE - CONSTITUTIONAL, MLM
Well appearing, awake, alert, oriented to person, place, time/situation and in no apparent distress. spitting up normal...

## 2024-08-02 NOTE — DISCHARGE NOTE PROVIDER - HOSPITAL COURSE
HPI:  Patient is a 72yo M, PMH HTN, presents to ED with food bolus impaction after eating steak earlier today. ED tried glucagon and trial oral diet, but patient did not tolerate. He denies SOB, abdominal pain, nausea, vomiting, chills, fever.  He is NPO for EGD by GI.      Course: Patient had upper EGD by GI. The food bolus was removed, small shatzki ring noted, small ulceration noted at site of food impaction.  Patient recovered well in PACU. Patient is stable for DC home today.    PLAN:  DC home today  Rx Protonix 40mg BID  soft regular diet  follow up with GI outpatient  continue home meds  f/u PCP within 1 week

## 2024-08-02 NOTE — CONSULT NOTE ADULT - SUBJECTIVE AND OBJECTIVE BOX
[facility]    Tres Ricardo Reyes NP    [address] [place] [id number] [phone]      Chief Complaint:  Patient is a 73y old  Male who presents with a chief complaint of     HPI: 73M with hx as listed who presents with food impaction  he was eating steak at 1pm  unable to susana secretions, no improvement with glucagon  no prior hx of food impaction, dysphagia, or  upper gastrointestinal endoscopy     Allergies:  No Known Allergies      Medications:  acetaminophen     Tablet .. 650 milliGRAM(s) Oral every 6 hours PRN  acetaminophen   IVPB .. 1000 milliGRAM(s) IV Intermittent once PRN  aluminum hydroxide/magnesium hydroxide/simethicone Suspension 30 milliLiter(s) Oral every 4 hours PRN  HYDROmorphone  Injectable 0.5 milliGRAM(s) IV Push every 10 minutes PRN  lactated ringers. 1000 milliLiter(s) IV Continuous <Continuous>  melatonin 3 milliGRAM(s) Oral at bedtime PRN  ondansetron Injectable 4 milliGRAM(s) IV Push once PRN  ondansetron Injectable 4 milliGRAM(s) IV Push every 8 hours PRN      PMHX/PSHX:      Family history:      Social History:     ROS:     General:  No wt loss, fevers, chills, night sweats, fatigue,   Eyes:  Good vision, no reported pain  ENT:  No sore throat, pain, runny nose, dysphagia  CV:  No pain, palpitations, hypo/hypertension  Resp:  No dyspnea, cough, tachypnea, wheezing  GI:  No pain, No nausea, No vomiting, No diarrhea, No constipation, No weight loss, No fever, No pruritis, No rectal bleeding, No tarry stools, + dysphagia,  :  No pain, bleeding, incontinence, nocturia  Muscle:  No pain, weakness  Neuro:  No weakness, tingling, memory problems  Psych:  No fatigue, insomnia, mood problems, depression  Endocrine:  No polyuria, polydipsia, cold/heat intolerance  Heme:  No petechiae, ecchymosis, easy bruisability  Skin:  No rash, tattoos, scars, edema      PHYSICAL EXAM:   Vital Signs:  Vital Signs Last 24 Hrs  T(C): 36.8 (02 Aug 2024 18:52), Max: 36.9 (02 Aug 2024 14:26)  T(F): 98.3 (02 Aug 2024 18:52), Max: 98.4 (02 Aug 2024 14:26)  HR: 68 (02 Aug 2024 18:52) (60 - 92)  BP: 161/101 (02 Aug 2024 18:52) (153/91 - 161/101)  BP(mean): --  RR: 16 (02 Aug 2024 18:52) (16 - 16)  SpO2: 98% (02 Aug 2024 18:52) (96% - 99%)    Parameters below as of 02 Aug 2024 14:26  Patient On (Oxygen Delivery Method): room air      Daily Height in cm: 170.18 (02 Aug 2024 14:26)    Daily     GENERAL:  Appears stated age, well-groomed, well-nourished, no distress  HEENT:  NC/AT,  conjunctivae clear and pink, no thyromegaly, nodules, adenopathy, no JVD, sclera -anicteric  CHEST:  Full & symmetric excursion, no increased effort, breath sounds clear  HEART:  Regular rhythm, S1, S2, no murmur/rub/S3/S4, no abdominal bruit, no edema  ABDOMEN:  Soft, non-tender, non-distended, normoactive bowel sounds,  no masses ,no hepato-splenomegaly, no signs of chronic liver disease  EXTEREMITIES:  no cyanosis,clubbing or edema  SKIN:  No rash/erythema/ecchymoses/petechiae/wounds/abscess/warm/dry  NEURO:  Alert, oriented, no asterixis, no tremor, no encephalopathy    LABS:                        16.7   9.97  )-----------( 260      ( 02 Aug 2024 16:00 )             52.0     08-02    141  |  105  |  21  ----------------------------<  127<H>  4.2   |  25  |  1.40<H>    Ca    10.0      02 Aug 2024 16:00    TPro  8.0  /  Alb  4.3  /  TBili  0.4  /  DBili  x   /  AST  26  /  ALT  28  /  AlkPhos  51  08-02    LIVER FUNCTIONS - ( 02 Aug 2024 16:00 )  Alb: 4.3 g/dL / Pro: 8.0 g/dL / ALK PHOS: 51 U/L / ALT: 28 U/L / AST: 26 U/L / GGT: x           PT/INR - ( 02 Aug 2024 16:00 )   PT: 11.2 sec;   INR: 0.96 ratio         PTT - ( 02 Aug 2024 16:00 )  PTT:33.6 sec  Urinalysis Basic - ( 02 Aug 2024 16:00 )    Color: x / Appearance: x / SG: x / pH: x  Gluc: 127 mg/dL / Ketone: x  / Bili: x / Urobili: x   Blood: x / Protein: x / Nitrite: x   Leuk Esterase: x / RBC: x / WBC x   Sq Epi: x / Non Sq Epi: x / Bacteria: x          Imaging:

## 2024-08-02 NOTE — DISCHARGE NOTE NURSING/CASE MANAGEMENT/SOCIAL WORK - PATIENT PORTAL LINK FT
You can access the FollowMyHealth Patient Portal offered by [facility] by registering at the following website: [email or web address]. By joining [facility]’s FollowMyHealth portal, you will also be able to view your health information using other applications (apps) compatible with our system.

## 2024-08-02 NOTE — ED PROVIDER NOTE - ATTENDING APP SHARED VISIT CONTRIBUTION OF CARE
This was a shared visit with KAT. I reviewed and verified the documentation and independently performed the documented MDM.

## 2024-11-15 ENCOUNTER — APPOINTMENT (EMERGENCY)
Dept: RADIOLOGY | Facility: HOSPITAL | Age: 74
DRG: 439 | End: 2024-11-15
Payer: MEDICARE

## 2024-11-15 ENCOUNTER — HOSPITAL ENCOUNTER (INPATIENT)
Facility: HOSPITAL | Age: 74
LOS: 4 days | Discharge: HOME | DRG: 439 | End: 2024-11-19
Attending: EMERGENCY MEDICINE | Admitting: STUDENT IN AN ORGANIZED HEALTH CARE EDUCATION/TRAINING PROGRAM
Payer: MEDICARE

## 2024-11-15 ENCOUNTER — OFFICE VISIT (OUTPATIENT)
Age: 74
End: 2024-11-15
Payer: MEDICARE

## 2024-11-15 ENCOUNTER — APPOINTMENT (EMERGENCY)
Dept: CT IMAGING | Facility: HOSPITAL | Age: 74
DRG: 439 | End: 2024-11-15
Payer: MEDICARE

## 2024-11-15 VITALS
RESPIRATION RATE: 32 BRPM | DIASTOLIC BLOOD PRESSURE: 88 MMHG | TEMPERATURE: 96 F | OXYGEN SATURATION: 94 % | HEART RATE: 100 BPM | SYSTOLIC BLOOD PRESSURE: 157 MMHG

## 2024-11-15 DIAGNOSIS — K81.9 CHOLECYSTITIS: Primary | ICD-10-CM

## 2024-11-15 DIAGNOSIS — R11.2 NAUSEA AND VOMITING, UNSPECIFIED VOMITING TYPE: ICD-10-CM

## 2024-11-15 DIAGNOSIS — K85.90 PANCREATITIS: ICD-10-CM

## 2024-11-15 DIAGNOSIS — K85.10 GALLSTONE PANCREATITIS: ICD-10-CM

## 2024-11-15 DIAGNOSIS — R10.10 UPPER ABDOMINAL PAIN: Primary | ICD-10-CM

## 2024-11-15 DIAGNOSIS — R06.82 TACHYPNEA: ICD-10-CM

## 2024-11-15 PROBLEM — I10 HTN (HYPERTENSION): Status: ACTIVE | Noted: 2024-11-15

## 2024-11-15 LAB
2HR DELTA HS TROPONIN: 4 NG/L
4HR DELTA HS TROPONIN: 6 NG/L
ALBUMIN SERPL BCG-MCNC: 4.2 G/DL (ref 3.5–5)
ALP SERPL-CCNC: 142 U/L (ref 34–104)
ALT SERPL W P-5'-P-CCNC: 613 U/L (ref 7–52)
ANION GAP SERPL CALCULATED.3IONS-SCNC: 7 MMOL/L (ref 4–13)
AST SERPL W P-5'-P-CCNC: 665 U/L (ref 13–39)
ATRIAL RATE: 91 BPM
BASOPHILS # BLD AUTO: 0.01 THOUSANDS/ÂΜL (ref 0–0.1)
BASOPHILS NFR BLD AUTO: 0 % (ref 0–1)
BILIRUB SERPL-MCNC: 3.3 MG/DL (ref 0.2–1)
BUN SERPL-MCNC: 16 MG/DL (ref 5–25)
CALCIUM SERPL-MCNC: 10.6 MG/DL (ref 8.4–10.2)
CARDIAC TROPONIN I PNL SERPL HS: 11 NG/L (ref ?–50)
CARDIAC TROPONIN I PNL SERPL HS: 5 NG/L (ref ?–50)
CARDIAC TROPONIN I PNL SERPL HS: 9 NG/L (ref ?–50)
CHLORIDE SERPL-SCNC: 104 MMOL/L (ref 96–108)
CO2 SERPL-SCNC: 30 MMOL/L (ref 21–32)
CREAT SERPL-MCNC: 1.21 MG/DL (ref 0.6–1.3)
EOSINOPHIL # BLD AUTO: 0.06 THOUSAND/ÂΜL (ref 0–0.61)
EOSINOPHIL NFR BLD AUTO: 1 % (ref 0–6)
ERYTHROCYTE [DISTWIDTH] IN BLOOD BY AUTOMATED COUNT: 13.2 % (ref 11.6–15.1)
GFR SERPL CREATININE-BSD FRML MDRD: 58 ML/MIN/1.73SQ M
GLUCOSE SERPL-MCNC: 150 MG/DL (ref 65–140)
HCT VFR BLD AUTO: 52 % (ref 36.5–49.3)
HGB BLD-MCNC: 16.2 G/DL (ref 12–17)
IMM GRANULOCYTES # BLD AUTO: 0.07 THOUSAND/UL (ref 0–0.2)
IMM GRANULOCYTES NFR BLD AUTO: 1 % (ref 0–2)
LIPASE SERPL-CCNC: ABNORMAL U/L (ref 11–82)
LYMPHOCYTES # BLD AUTO: 0.95 THOUSANDS/ÂΜL (ref 0.6–4.47)
LYMPHOCYTES NFR BLD AUTO: 7 % (ref 14–44)
MCH RBC QN AUTO: 27.9 PG (ref 26.8–34.3)
MCHC RBC AUTO-ENTMCNC: 31.2 G/DL (ref 31.4–37.4)
MCV RBC AUTO: 90 FL (ref 82–98)
MONOCYTES # BLD AUTO: 0.54 THOUSAND/ÂΜL (ref 0.17–1.22)
MONOCYTES NFR BLD AUTO: 4 % (ref 4–12)
NEUTROPHILS # BLD AUTO: 11.42 THOUSANDS/ÂΜL (ref 1.85–7.62)
NEUTS SEG NFR BLD AUTO: 87 % (ref 43–75)
NRBC BLD AUTO-RTO: 0 /100 WBCS
P AXIS: 54 DEGREES
PLATELET # BLD AUTO: 346 THOUSANDS/UL (ref 149–390)
PMV BLD AUTO: 8.9 FL (ref 8.9–12.7)
POTASSIUM SERPL-SCNC: 4 MMOL/L (ref 3.5–5.3)
PR INTERVAL: 146 MS
PROT SERPL-MCNC: 7.3 G/DL (ref 6.4–8.4)
QRS AXIS: 2 DEGREES
QRSD INTERVAL: 86 MS
QT INTERVAL: 352 MS
QTC INTERVAL: 432 MS
RBC # BLD AUTO: 5.81 MILLION/UL (ref 3.88–5.62)
SODIUM SERPL-SCNC: 141 MMOL/L (ref 135–147)
T WAVE AXIS: -36 DEGREES
VENTRICULAR RATE: 91 BPM
WBC # BLD AUTO: 13.05 THOUSAND/UL (ref 4.31–10.16)

## 2024-11-15 PROCEDURE — 96375 TX/PRO/DX INJ NEW DRUG ADDON: CPT

## 2024-11-15 PROCEDURE — 87040 BLOOD CULTURE FOR BACTERIA: CPT | Performed by: EMERGENCY MEDICINE

## 2024-11-15 PROCEDURE — 85025 COMPLETE CBC W/AUTO DIFF WBC: CPT | Performed by: EMERGENCY MEDICINE

## 2024-11-15 PROCEDURE — 99204 OFFICE O/P NEW MOD 45 MIN: CPT | Performed by: PHYSICIAN ASSISTANT

## 2024-11-15 PROCEDURE — 83690 ASSAY OF LIPASE: CPT | Performed by: EMERGENCY MEDICINE

## 2024-11-15 PROCEDURE — 93005 ELECTROCARDIOGRAM TRACING: CPT

## 2024-11-15 PROCEDURE — 84484 ASSAY OF TROPONIN QUANT: CPT | Performed by: EMERGENCY MEDICINE

## 2024-11-15 PROCEDURE — 36415 COLL VENOUS BLD VENIPUNCTURE: CPT | Performed by: EMERGENCY MEDICINE

## 2024-11-15 PROCEDURE — 99285 EMERGENCY DEPT VISIT HI MDM: CPT | Performed by: EMERGENCY MEDICINE

## 2024-11-15 PROCEDURE — 99223 1ST HOSP IP/OBS HIGH 75: CPT | Performed by: STUDENT IN AN ORGANIZED HEALTH CARE EDUCATION/TRAINING PROGRAM

## 2024-11-15 PROCEDURE — 96365 THER/PROPH/DIAG IV INF INIT: CPT

## 2024-11-15 PROCEDURE — 74177 CT ABD & PELVIS W/CONTRAST: CPT

## 2024-11-15 PROCEDURE — 87154 CUL TYP ID BLD PTHGN 6+ TRGT: CPT | Performed by: EMERGENCY MEDICINE

## 2024-11-15 PROCEDURE — 96361 HYDRATE IV INFUSION ADD-ON: CPT

## 2024-11-15 PROCEDURE — G0463 HOSPITAL OUTPT CLINIC VISIT: HCPCS | Performed by: PHYSICIAN ASSISTANT

## 2024-11-15 PROCEDURE — 71046 X-RAY EXAM CHEST 2 VIEWS: CPT

## 2024-11-15 PROCEDURE — 99285 EMERGENCY DEPT VISIT HI MDM: CPT

## 2024-11-15 PROCEDURE — 80053 COMPREHEN METABOLIC PANEL: CPT | Performed by: EMERGENCY MEDICINE

## 2024-11-15 PROCEDURE — 93010 ELECTROCARDIOGRAM REPORT: CPT | Performed by: INTERNAL MEDICINE

## 2024-11-15 RX ORDER — HYDROMORPHONE HCL/PF 1 MG/ML
0.5 SYRINGE (ML) INJECTION EVERY 4 HOURS PRN
Refills: 0 | Status: DISCONTINUED | OUTPATIENT
Start: 2024-11-15 | End: 2024-11-15

## 2024-11-15 RX ORDER — ONDANSETRON 4 MG/1
4 TABLET, ORALLY DISINTEGRATING ORAL ONCE
Status: DISCONTINUED | OUTPATIENT
Start: 2024-11-15 | End: 2024-11-15

## 2024-11-15 RX ORDER — ONDANSETRON 2 MG/ML
4 INJECTION INTRAMUSCULAR; INTRAVENOUS ONCE
Status: COMPLETED | OUTPATIENT
Start: 2024-11-15 | End: 2024-11-15

## 2024-11-15 RX ORDER — HYDROMORPHONE HCL/PF 1 MG/ML
0.5 SYRINGE (ML) INJECTION ONCE
Refills: 0 | Status: COMPLETED | OUTPATIENT
Start: 2024-11-15 | End: 2024-11-15

## 2024-11-15 RX ORDER — METRONIDAZOLE 500 MG/100ML
500 INJECTION, SOLUTION INTRAVENOUS EVERY 8 HOURS
Status: DISCONTINUED | OUTPATIENT
Start: 2024-11-15 | End: 2024-11-19 | Stop reason: HOSPADM

## 2024-11-15 RX ORDER — PANTOPRAZOLE SODIUM 40 MG/1
40 TABLET, DELAYED RELEASE ORAL DAILY
COMMUNITY

## 2024-11-15 RX ORDER — HYDROMORPHONE HCL/PF 1 MG/ML
1 SYRINGE (ML) INJECTION EVERY 4 HOURS PRN
Status: DISCONTINUED | OUTPATIENT
Start: 2024-11-15 | End: 2024-11-19 | Stop reason: HOSPADM

## 2024-11-15 RX ORDER — SODIUM CHLORIDE, SODIUM LACTATE, POTASSIUM CHLORIDE, CALCIUM CHLORIDE 600; 310; 30; 20 MG/100ML; MG/100ML; MG/100ML; MG/100ML
150 INJECTION, SOLUTION INTRAVENOUS CONTINUOUS
Status: DISCONTINUED | OUTPATIENT
Start: 2024-11-15 | End: 2024-11-17

## 2024-11-15 RX ORDER — ENOXAPARIN SODIUM 100 MG/ML
40 INJECTION SUBCUTANEOUS DAILY
Status: DISCONTINUED | OUTPATIENT
Start: 2024-11-16 | End: 2024-11-19 | Stop reason: HOSPADM

## 2024-11-15 RX ORDER — FAMOTIDINE 10 MG/ML
20 INJECTION, SOLUTION INTRAVENOUS ONCE
Status: COMPLETED | OUTPATIENT
Start: 2024-11-15 | End: 2024-11-15

## 2024-11-15 RX ORDER — ENALAPRIL MALEATE 5 MG/1
5 TABLET ORAL DAILY
COMMUNITY

## 2024-11-15 RX ORDER — METRONIDAZOLE 500 MG/100ML
500 INJECTION, SOLUTION INTRAVENOUS ONCE
Status: COMPLETED | OUTPATIENT
Start: 2024-11-15 | End: 2024-11-15

## 2024-11-15 RX ORDER — PANTOPRAZOLE SODIUM 40 MG/10ML
40 INJECTION, POWDER, LYOPHILIZED, FOR SOLUTION INTRAVENOUS ONCE
Status: COMPLETED | OUTPATIENT
Start: 2024-11-15 | End: 2024-11-15

## 2024-11-15 RX ORDER — ACETAMINOPHEN 10 MG/ML
1000 INJECTION, SOLUTION INTRAVENOUS ONCE
Status: COMPLETED | OUTPATIENT
Start: 2024-11-15 | End: 2024-11-15

## 2024-11-15 RX ADMIN — ONDANSETRON 4 MG: 2 INJECTION INTRAMUSCULAR; INTRAVENOUS at 13:31

## 2024-11-15 RX ADMIN — IOHEXOL 100 ML: 350 INJECTION, SOLUTION INTRAVENOUS at 14:56

## 2024-11-15 RX ADMIN — METRONIDAZOLE 500 MG: 500 INJECTION, SOLUTION INTRAVENOUS at 17:27

## 2024-11-15 RX ADMIN — ONDANSETRON 4 MG: 4 TABLET, ORALLY DISINTEGRATING ORAL at 12:50

## 2024-11-15 RX ADMIN — FAMOTIDINE 20 MG: 10 INJECTION, SOLUTION INTRAVENOUS at 13:43

## 2024-11-15 RX ADMIN — ACETAMINOPHEN 1000 MG: 10 INJECTION INTRAVENOUS at 13:35

## 2024-11-15 RX ADMIN — HYDROMORPHONE HYDROCHLORIDE 0.5 MG: 1 INJECTION, SOLUTION INTRAMUSCULAR; INTRAVENOUS; SUBCUTANEOUS at 19:44

## 2024-11-15 RX ADMIN — METRONIDAZOLE 500 MG: 500 INJECTION, SOLUTION INTRAVENOUS at 23:36

## 2024-11-15 RX ADMIN — PANTOPRAZOLE SODIUM 40 MG: 40 INJECTION, POWDER, FOR SOLUTION INTRAVENOUS at 13:35

## 2024-11-15 RX ADMIN — SODIUM CHLORIDE 1000 ML: 0.9 INJECTION, SOLUTION INTRAVENOUS at 13:30

## 2024-11-15 RX ADMIN — HYDROMORPHONE HYDROCHLORIDE 1 MG: 1 INJECTION, SOLUTION INTRAMUSCULAR; INTRAVENOUS; SUBCUTANEOUS at 23:47

## 2024-11-15 RX ADMIN — SODIUM CHLORIDE, SODIUM LACTATE, POTASSIUM CHLORIDE, AND CALCIUM CHLORIDE 200 ML/HR: .6; .31; .03; .02 INJECTION, SOLUTION INTRAVENOUS at 17:27

## 2024-11-15 RX ADMIN — HYDROMORPHONE HYDROCHLORIDE 0.5 MG: 1 INJECTION, SOLUTION INTRAMUSCULAR; INTRAVENOUS; SUBCUTANEOUS at 13:35

## 2024-11-15 RX ADMIN — CEFTRIAXONE SODIUM 1000 MG: 10 INJECTION, POWDER, FOR SOLUTION INTRAVENOUS at 16:23

## 2024-11-15 RX ADMIN — SODIUM CHLORIDE, SODIUM LACTATE, POTASSIUM CHLORIDE, AND CALCIUM CHLORIDE 200 ML/HR: .6; .31; .03; .02 INJECTION, SOLUTION INTRAVENOUS at 23:36

## 2024-11-15 NOTE — ASSESSMENT & PLAN NOTE
Presenting with abdominal pain  CTAP with cholelithiasis and concern for acute cholecystitis  Covered with antibiotics continue ceftriaxone and Flagyl  Surgery consulted and will see the patient tomorrow

## 2024-11-15 NOTE — ASSESSMENT & PLAN NOTE
Patient presenting with abdominal pain initially went to urgent care was sent here for further evaluation  CTAP showed cholelithiasis. Inflammatory changes around the gallbladder suspicious for acute cholecystitis. Possible pancreatitis  No etoh  Lipase noted to be elevated greater than 12,000.  Elevated AST 65, , alk phos 142  Fluids, pain control.   Clear liquid diet  GI consulted  Surgery notified and will see the patient tomorrow

## 2024-11-15 NOTE — ED PROVIDER NOTES
ED Disposition       None          Assessment & Plan       Medical Decision Making  Patient is a 73-year-old male past medical history of hypertension, kidney stones, peptic ulcer disease presenting with abdominal pain.  Patient is very uncomfortable at bedside, rolling in pain, voluntary guarding in all quadrants with what appears to be midline ventral hernia with no overlying skin changes, not easily reducible at bedside, patient actively retching, no other significant physical exam findings.  Will give antiemetic, pain control, fluids, obtain labs to assess for electrolyte MIs, anemia, transaminitis, pancreatitis, cardiac workup to rule out ACS, CT abdomen pelvis to rule out perforation, diverticulitis, appendicitis, other intra-abdominal pathology, and continue to monitor.    Amount and/or Complexity of Data Reviewed  Labs: ordered.  Radiology: ordered.    Risk  Prescription drug management.        ED Course as of 11/15/24 2059   Fri Nov 15, 2024   1544 Patient with cholecystitis and pancreatitis, will discuss with surgery.       Medications   pantoprazole (PROTONIX) injection 40 mg (has no administration in time range)   Famotidine (PF) (PEPCID) injection 20 mg (has no administration in time range)   HYDROmorphone (DILAUDID) injection 0.5 mg (has no administration in time range)   acetaminophen (Ofirmev) injection 1,000 mg (has no administration in time range)   sodium chloride 0.9 % bolus 1,000 mL (has no administration in time range)   ondansetron (ZOFRAN) injection 4 mg (4 mg Intravenous Given 11/15/24 1331)       ED Risk Strat Scores                                               History of Present Illness       Chief Complaint   Patient presents with   • Abdominal Pain     BIBA from  for complaints of severe pain started last night, positive, N/V/D       Past Medical History:   Diagnosis Date   • Hypertension    • Kidney stones    • Peptic ulceration       Past Surgical History:   Procedure Laterality  Date   • APPENDECTOMY        No family history on file.   Social History     Tobacco Use   • Smoking status: Former     Types: Cigarettes   • Smokeless tobacco: Never      E-Cigarette/Vaping      E-Cigarette/Vaping Substances      I have reviewed and agree with the history as documented.     Patient is a 73-year-old male past medical history of hypertension, kidney stones, peptic ulcer disease presenting with abdominal pain.  Patient notes constant generalized abdominal pain radiating to his back since last night which he has never had before.  Notes several episodes, less than 10, nonbilious, nonbloody vomit and denies diarrhea.  Denies any chest pain, shortness breath, fevers, urinary symptoms, dizziness, rashes, vision changes.  Has not taken any medication for it is never had before.        Review of Systems   All other systems reviewed and are negative.          Objective       ED Triage Vitals [11/15/24 1309]   Temperature Pulse Blood Pressure Respirations SpO2 Patient Position - Orthostatic VS   97.7 °F (36.5 °C) 90 123/88 (!) 30 94 % Lying      Temp Source Heart Rate Source BP Location FiO2 (%) Pain Score    Oral Monitor Left arm -- --      Vitals      Date and Time Temp Pulse SpO2 Resp BP Pain Score FACES Pain Rating User   11/15/24 1309 97.7 °F (36.5 °C) 90 94 % 30 123/88 -- -- GP            Physical Exam  Vitals reviewed.   Constitutional:       General: He is not in acute distress.     Appearance: Normal appearance. He is ill-appearing.   HENT:      Mouth/Throat:      Mouth: Mucous membranes are moist.   Eyes:      Conjunctiva/sclera: Conjunctivae normal.      Comments: Normal conjunctiva   Cardiovascular:      Rate and Rhythm: Normal rate and regular rhythm.      Heart sounds: Normal heart sounds.      Comments: Equal bilateral radial pulses  Pulmonary:      Effort: Pulmonary effort is normal.      Breath sounds: Normal breath sounds.   Abdominal:      General: Abdomen is flat.      Palpations: Abdomen  is soft.      Tenderness: There is generalized abdominal tenderness. There is guarding. There is no right CVA tenderness or left CVA tenderness.   Musculoskeletal:         General: No swelling. Normal range of motion.      Cervical back: Neck supple.   Skin:     General: Skin is warm and dry.   Neurological:      General: No focal deficit present.      Mental Status: He is alert.   Psychiatric:         Mood and Affect: Mood normal.         Results Reviewed       Procedure Component Value Units Date/Time    HS Troponin 0hr (reflex protocol) [[id number]] Collected: 11/15/24 1330    Lab Status: In process Specimen: Blood from Arm, Left Updated: 11/15/24 1334    Comprehensive metabolic panel [[id number]] Collected: 11/15/24 1330    Lab Status: In process Specimen: Blood from Arm, Left Updated: 11/15/24 1334    Lipase [[id number]] Collected: 11/15/24 1330    Lab Status: In process Specimen: Blood from Arm, Left Updated: 11/15/24 1334    CBC and differential [[id number]] Collected: 11/15/24 1330    Lab Status: In process Specimen: Blood from Arm, Left Updated: 11/15/24 1334            No orders to display       ECG 12 Lead Documentation Only    Date/Time: 11/15/2024 1:45 PM    Performed by: Mariela Mascorro DO  Authorized by: Mariela Mascorro DO    Patient location:  ED  Previous ECG:     Previous ECG:  Unavailable  Interpretation:     Interpretation: non-specific    Rate:     ECG rate assessment: normal    Rhythm:     Rhythm: sinus rhythm    Ectopy:     Ectopy: none    QRS:     QRS axis:  Normal    QRS intervals:  Normal  Conduction:     Conduction: normal    ST segments:     ST segments:  Normal  T waves:     T waves: non-specific        ED Medication and Procedure Management   Prior to Admission Medications   Prescriptions Last Dose Informant Patient Reported? Taking?   enalapril (VASOTEC) 5 mg tablet   Yes No   Sig: Take 5 mg by mouth daily   pantoprazole (PROTONIX) 40 mg tablet   Yes No   Sig: Take 40 mg by mouth  daily      Facility-Administered Medications Last Administration Doses Remaining   ondansetron (ZOFRAN-ODT) dispersible tablet 4 mg 11/15/2024 12:50 PM 0        Patient's Medications   Discharge Prescriptions    No medications on file     No discharge procedures on file.  ED SEPSIS DOCUMENTATION            Mariela Mascorro DO  11/15/24 2059

## 2024-11-15 NOTE — PROGRESS NOTES
[facility]        NAME: Alonzo Babin is a 73 y.o. male  : 1950    MRN: [id number]  DATE: November 15, 2024  TIME: 1:25 PM    Assessment and Plan   Upper abdominal pain [R10.10]  1. Upper abdominal pain  Transfer to other facility      2. Tachypnea  Transfer to other facility      3. Nausea and vomiting, unspecified vomiting type  ondansetron (ZOFRAN-ODT) dispersible tablet 4 mg          Patient presents today in distress tachypneic with significant abdominal pain no rebound tenderness but guarding and severe tenderness in the left did not right and right upper quadrants.  Discussed with patient that emergency department valuation recommended.  He understands.  EMS was contacted arrived transported to [facility]      The patient and/or parent/legal guardian verbalized understanding of exam findings and   Treatment plan. We engaged in the shared decision-making process and treatment options were   discussed at length with the patient.  All questions, concerns and complaints were answered and   addressed to the patient's' and/or parent/legal guardians's satisfaction.    Patient Instructions   There are no Patient Instructions on file for this visit.    Follow up with PCP in 3-5 days.  Proceed to  ER if symptoms worsen.    If tests are performed, our office will contact you with results only if   changes need to made to the care plan discussed with you at the visit.   You can review your full results on [facility].     Chief Complaint     Chief Complaint   Patient presents with    Abdominal Pain     Pt wife states couple went out to dinner last night and developed stomach pains, reflux, and vomiting. This morning pt woke up with severe lower abdominal pain, difficulty breathing, back pain, and vomiting.          History of Present Illness       HPI  Patient presents complaining of severe abdominal pain that started today nausea vomiting.  Last day he did have some stomach pain  and some reflux he vomited once.  Today's difficulty breathing is having back pain and severe pain in the lower abdomen.  Has history of hypertension.  History of appendectomy reported by wife in the remote past.    Review of Systems   Review of Systems  All other related systems reviewed and are negative except as noted in HPI    Current Medications     No current facility-administered medications for this visit.    Current Outpatient Medications:     enalapril (VASOTEC) 5 mg tablet, Take 5 mg by mouth daily, Disp: , Rfl:     pantoprazole (PROTONIX) 40 mg tablet, Take 40 mg by mouth daily, Disp: , Rfl:     Current Allergies     Allergies as of 11/15/2024    (No Known Allergies)            The following portions of the patient's history were reviewed and updated as appropriate: allergies, current medications, past family history, past medical history, past social history, past surgical history and problem list.     Past Medical History:   Diagnosis Date    Hypertension     Kidney stones     Peptic ulceration        Past Surgical History:   Procedure Laterality Date    APPENDECTOMY         History reviewed. No pertinent family history.      Medications have been verified.        Objective   /88   Pulse 100   Temp (!) 96 °F (35.6 °C)   Resp (!) 32   SpO2 94%   No LMP for male patient.       Physical Exam     Physical Exam  Constitutional:       General: He is not in acute distress.     Appearance: He is well-developed. He is ill-appearing.   HENT:      Head: Normocephalic and atraumatic.   Eyes:      General: No scleral icterus.     Conjunctiva/sclera: Conjunctivae normal.   Cardiovascular:      Heart sounds:      Gallop: adt 21.   Pulmonary:      Effort: Pulmonary effort is normal. No respiratory distress.      Breath sounds: No stridor.   Abdominal:      General: Abdomen is flat. Bowel sounds are decreased. There is distension.      Palpations: Abdomen is rigid.      Tenderness: There is abdominal  "tenderness in the right upper quadrant and left upper quadrant. There is guarding. There is no rebound. Negative signs include Tyler's sign, Rovsing's sign, McBurney's sign and psoas sign.   Musculoskeletal:      Cervical back: Normal range of motion and neck supple.   Skin:     General: Skin is warm and dry.   Neurological:      Mental Status: He is alert and oriented to person, place, and time.   Psychiatric:         Behavior: Behavior normal.         Ortho Exam        Procedures  No Procedures performed today        Note: Portions of this record may have been created with voice recognition software. Occasional wrong word or \"sound a like\" substitutions may have occurred due to the inherent limitations of voice recognition software. Please read the chart carefully and recognize, using context, where substitutions have occurred.*      "

## 2024-11-16 ENCOUNTER — APPOINTMENT (INPATIENT)
Dept: MRI IMAGING | Facility: HOSPITAL | Age: 74
DRG: 439 | End: 2024-11-16
Payer: MEDICARE

## 2024-11-16 LAB
ALBUMIN SERPL BCG-MCNC: 3.8 G/DL (ref 3.5–5)
ALP SERPL-CCNC: 138 U/L (ref 34–104)
ALT SERPL W P-5'-P-CCNC: 692 U/L (ref 7–52)
ANION GAP SERPL CALCULATED.3IONS-SCNC: 7 MMOL/L (ref 4–13)
AST SERPL W P-5'-P-CCNC: 556 U/L (ref 13–39)
BASOPHILS # BLD AUTO: 0.03 THOUSANDS/ÂΜL (ref 0–0.1)
BASOPHILS NFR BLD AUTO: 0 % (ref 0–1)
BILIRUB SERPL-MCNC: 4.39 MG/DL (ref 0.2–1)
BUN SERPL-MCNC: 17 MG/DL (ref 5–25)
CALCIUM SERPL-MCNC: 9.4 MG/DL (ref 8.4–10.2)
CHLORIDE SERPL-SCNC: 108 MMOL/L (ref 96–108)
CO2 SERPL-SCNC: 27 MMOL/L (ref 21–32)
CREAT SERPL-MCNC: 1.27 MG/DL (ref 0.6–1.3)
EOSINOPHIL # BLD AUTO: 0.04 THOUSAND/ÂΜL (ref 0–0.61)
EOSINOPHIL NFR BLD AUTO: 0 % (ref 0–6)
ERYTHROCYTE [DISTWIDTH] IN BLOOD BY AUTOMATED COUNT: 13.5 % (ref 11.6–15.1)
GFR SERPL CREATININE-BSD FRML MDRD: 54 ML/MIN/1.73SQ M
GLUCOSE SERPL-MCNC: 112 MG/DL (ref 65–140)
HCT VFR BLD AUTO: 48 % (ref 36.5–49.3)
HGB BLD-MCNC: 15 G/DL (ref 12–17)
IMM GRANULOCYTES # BLD AUTO: 0.03 THOUSAND/UL (ref 0–0.2)
IMM GRANULOCYTES NFR BLD AUTO: 0 % (ref 0–2)
LIPASE SERPL-CCNC: 2158 U/L (ref 11–82)
LYMPHOCYTES # BLD AUTO: 0.65 THOUSANDS/ÂΜL (ref 0.6–4.47)
LYMPHOCYTES NFR BLD AUTO: 5 % (ref 14–44)
MAGNESIUM SERPL-MCNC: 1.9 MG/DL (ref 1.9–2.7)
MCH RBC QN AUTO: 28.2 PG (ref 26.8–34.3)
MCHC RBC AUTO-ENTMCNC: 31.3 G/DL (ref 31.4–37.4)
MCV RBC AUTO: 90 FL (ref 82–98)
MONOCYTES # BLD AUTO: 0.66 THOUSAND/ÂΜL (ref 0.17–1.22)
MONOCYTES NFR BLD AUTO: 5 % (ref 4–12)
NEUTROPHILS # BLD AUTO: 11.05 THOUSANDS/ÂΜL (ref 1.85–7.62)
NEUTS SEG NFR BLD AUTO: 90 % (ref 43–75)
NRBC BLD AUTO-RTO: 0 /100 WBCS
PHOSPHATE SERPL-MCNC: 3.3 MG/DL (ref 2.3–4.1)
PLATELET # BLD AUTO: 283 THOUSANDS/UL (ref 149–390)
PMV BLD AUTO: 8.9 FL (ref 8.9–12.7)
POTASSIUM SERPL-SCNC: 4.2 MMOL/L (ref 3.5–5.3)
PROT SERPL-MCNC: 6.5 G/DL (ref 6.4–8.4)
RBC # BLD AUTO: 5.32 MILLION/UL (ref 3.88–5.62)
SODIUM SERPL-SCNC: 142 MMOL/L (ref 135–147)
WBC # BLD AUTO: 12.46 THOUSAND/UL (ref 4.31–10.16)

## 2024-11-16 PROCEDURE — 99222 1ST HOSP IP/OBS MODERATE 55: CPT | Performed by: SURGERY

## 2024-11-16 PROCEDURE — 83690 ASSAY OF LIPASE: CPT

## 2024-11-16 PROCEDURE — 84100 ASSAY OF PHOSPHORUS: CPT | Performed by: STUDENT IN AN ORGANIZED HEALTH CARE EDUCATION/TRAINING PROGRAM

## 2024-11-16 PROCEDURE — 83735 ASSAY OF MAGNESIUM: CPT | Performed by: STUDENT IN AN ORGANIZED HEALTH CARE EDUCATION/TRAINING PROGRAM

## 2024-11-16 PROCEDURE — 85025 COMPLETE CBC W/AUTO DIFF WBC: CPT | Performed by: STUDENT IN AN ORGANIZED HEALTH CARE EDUCATION/TRAINING PROGRAM

## 2024-11-16 PROCEDURE — 80053 COMPREHEN METABOLIC PANEL: CPT | Performed by: STUDENT IN AN ORGANIZED HEALTH CARE EDUCATION/TRAINING PROGRAM

## 2024-11-16 PROCEDURE — 99223 1ST HOSP IP/OBS HIGH 75: CPT | Performed by: INTERNAL MEDICINE

## 2024-11-16 PROCEDURE — 74181 MRI ABDOMEN W/O CONTRAST: CPT

## 2024-11-16 PROCEDURE — 99232 SBSQ HOSP IP/OBS MODERATE 35: CPT

## 2024-11-16 RX ORDER — HYDROMORPHONE HCL/PF 1 MG/ML
0.5 SYRINGE (ML) INJECTION EVERY 2 HOUR PRN
Status: DISCONTINUED | OUTPATIENT
Start: 2024-11-16 | End: 2024-11-19 | Stop reason: HOSPADM

## 2024-11-16 RX ORDER — LORAZEPAM 2 MG/ML
0.5 INJECTION INTRAMUSCULAR ONCE
Status: COMPLETED | OUTPATIENT
Start: 2024-11-16 | End: 2024-11-16

## 2024-11-16 RX ORDER — LISINOPRIL 10 MG/1
10 TABLET ORAL DAILY
Status: DISCONTINUED | OUTPATIENT
Start: 2024-11-16 | End: 2024-11-19 | Stop reason: HOSPADM

## 2024-11-16 RX ADMIN — HYDROMORPHONE HYDROCHLORIDE 0.5 MG: 1 INJECTION, SOLUTION INTRAMUSCULAR; INTRAVENOUS; SUBCUTANEOUS at 12:07

## 2024-11-16 RX ADMIN — LISINOPRIL 10 MG: 10 TABLET ORAL at 11:00

## 2024-11-16 RX ADMIN — ENOXAPARIN SODIUM 40 MG: 40 INJECTION SUBCUTANEOUS at 08:44

## 2024-11-16 RX ADMIN — LORAZEPAM 0.5 MG: 2 INJECTION INTRAMUSCULAR; INTRAVENOUS at 17:43

## 2024-11-16 RX ADMIN — METRONIDAZOLE 500 MG: 500 INJECTION, SOLUTION INTRAVENOUS at 15:48

## 2024-11-16 RX ADMIN — CEFTRIAXONE SODIUM 1000 MG: 10 INJECTION, POWDER, FOR SOLUTION INTRAVENOUS at 16:20

## 2024-11-16 RX ADMIN — METRONIDAZOLE 500 MG: 500 INJECTION, SOLUTION INTRAVENOUS at 08:41

## 2024-11-16 RX ADMIN — HYDROMORPHONE HYDROCHLORIDE 1 MG: 1 INJECTION, SOLUTION INTRAMUSCULAR; INTRAVENOUS; SUBCUTANEOUS at 08:43

## 2024-11-16 RX ADMIN — METRONIDAZOLE 500 MG: 500 INJECTION, SOLUTION INTRAVENOUS at 23:39

## 2024-11-16 RX ADMIN — SODIUM CHLORIDE, SODIUM LACTATE, POTASSIUM CHLORIDE, AND CALCIUM CHLORIDE 200 ML/HR: .6; .31; .03; .02 INJECTION, SOLUTION INTRAVENOUS at 12:18

## 2024-11-16 RX ADMIN — HYDROMORPHONE HYDROCHLORIDE 1 MG: 1 INJECTION, SOLUTION INTRAMUSCULAR; INTRAVENOUS; SUBCUTANEOUS at 04:30

## 2024-11-16 RX ADMIN — SODIUM CHLORIDE, SODIUM LACTATE, POTASSIUM CHLORIDE, AND CALCIUM CHLORIDE 200 ML/HR: .6; .31; .03; .02 INJECTION, SOLUTION INTRAVENOUS at 19:59

## 2024-11-16 RX ADMIN — HYDROMORPHONE HYDROCHLORIDE 1 MG: 1 INJECTION, SOLUTION INTRAMUSCULAR; INTRAVENOUS; SUBCUTANEOUS at 17:08

## 2024-11-16 NOTE — ASSESSMENT & PLAN NOTE
Patient presenting with abdominal pain initially went to urgent care was sent here for further evaluation  CTAP showed cholelithiasis. Inflammatory changes around the gallbladder suspicious for acute cholecystitis. Possible pancreatitis  No etoh  Lipase noted to be elevated greater than 12,000.  Elevated AST 65, , alk phos 142  Lipase down trended to 2,152  Blood cultures x 2.  1 set with preliminary result growth of gram-positive cocci in clusters.  Second set no growth  Continue IVF resuscitation at 200 mL an hour with plan to reduce tomorrow  Pain control and antiemetics.   N.p.o.  Continue ceftriaxone and Flagyl  GI consulted, recommendations appreciated  MRI/MRCP  Serial abdominal examination

## 2024-11-16 NOTE — CONSULTS
Consultation - Gastroenterology   Name: Alonzo Babin 73 y.o. male I MRN: [id number]  Unit/Bed#: -01 I Date of Admission: 11/15/2024   Date of Service: 11/16/2024 I Hospital Day: 1   [facility]ation -  [facility]  Alonzo Babin 73 y.o. male MRN: [id number]  Unit/Bed#: -01 Encounter: [id number]      Inpatient consult to gastroenterology  Consult performed by: Dora Jordan PA-C  Consult ordered by: Fady Garay MD        Reason for Consult / Principal Problem: Gallstone pancreatitis, transaminitis    ASSESSMENT & PLAN:    Gallstone Pancreatitis  Cholelithiasis/cholecystitis  Hyperbilirubinemia/transaminitis - concern for possible choledocholithiasis    - Patient presented to the ED with complaints of epigastric abdominal pain and nausea x 2 days.  - CT Scan A/P shows cholelithiasis and inflammatory changes around the gallbladder suspicious for acute cholecystitis as well as inflammatory changes around the pancreatic head.  - TB 4.39, , , . WBC count 12.46. Cr 1.27. Lipase >12,000.   - Patient is afebrile and VSS.  - NPO, fluid resuscitation with LR at 200cc/hr, serial abdominal exams, pain control.  - Ceftriaxone and Flagyl IV.   - Monitor patient and labs, vital signs. Follow up pending blood cultures.  - Will check MRCP to evaluate for choledocholithiasis.  Tentative ERCP on Monday if positive for choledocholithiasis.  - General surgery consulted.  - Incentive spirometry and DVT prophylaxis.      HPI: Alonzo Babin is a 73 y.o. year old male with a PMH of HTN who presented to the ED with complaints of abdominal pain.  Patient reports that he started to develop some epigastric abdominal discomfort Thursday evening.  He reports the pain then worsened on Friday with associated nausea and vomiting and he presented to the urgent care initially.  The urgent care recommended he go to the ER immediately.  Upon evaluation, patient was found to have evidence of  acute cholecystitis and gallstone pancreatitis.  He was noted to have a transaminitis/hyperbilirubinemia.  He denies any fevers.  He denies any prior history of pancreatitis.    REVIEW OF SYSTEMS:     CONSTITUTIONAL: Denies any fever, chills, or rigors. No weight loss.  HEENT: No earache or tinnitus. Denies hearing loss or visual disturbances.  CARDIOVASCULAR: No chest pain or palpitations.   RESPIRATORY: Denies any cough, hemoptysis, shortness of breath or dyspnea on exertion.  GASTROINTESTINAL: As noted in the History of Present Illness.   GENITOURINARY: No problems with urination. Denies any hematuria or dysuria.  NEUROLOGIC: No dizziness or vertigo, denies headaches.   MUSCULOSKELETAL: Denies any muscle or joint pain.   SKIN: Denies skin rashes or itching.   ENDOCRINE: Denies excessive thirst. Denies intolerance to heat or cold.  PSYCHOSOCIAL: Denies depression or anxiety. Denies any recent memory loss.     Historical Information   Past Medical History:   Diagnosis Date    Hypertension     Kidney stones     Peptic ulceration      Past Surgical History:   Procedure Laterality Date    APPENDECTOMY       Social History   Social History     Substance and Sexual Activity   Alcohol Use Not Currently     Social History     Substance and Sexual Activity   Drug Use Not on file     Social History     Tobacco Use   Smoking Status Former    Types: Cigarettes   Smokeless Tobacco Never     History reviewed. No pertinent family history.    Meds/Allergies       Facility-Administered Medications Prior to Admission:     [COMPLETED] ondansetron (ZOFRAN-ODT) dispersible tablet 4 mg    Medications Prior to Admission:     enalapril (VASOTEC) 5 mg tablet    pantoprazole (PROTONIX) 40 mg tablet    Current Facility-Administered Medications:     cefTRIAXone (ROCEPHIN) 1,000 mg in dextrose 5 % 50 mL IVPB, Q24H    enoxaparin (LOVENOX) subcutaneous injection 40 mg, Daily    HYDROmorphone (DILAUDID) injection 1 mg, Q4H PRN    lactated  "ringers infusion, Continuous, Last Rate: 200 mL/hr (11/15/24 2336)    lisinopril (ZESTRIL) tablet 10 mg, Daily    metroNIDAZOLE (FLAGYL) IVPB (premix) 500 mg 100 mL, Q8H, Last Rate: 500 mg (11/16/24 0841)    No Known Allergies    Objective   Blood pressure 153/93, pulse 88, temperature 98.8 °F (37.1 °C), resp. rate 18, height 5' 7\" (1.702 m), weight 100 kg (220 lb 7.4 oz), SpO2 94%.    Intake/Output Summary (Last 24 hours) at 11/16/2024 1023  Last data filed at 11/15/2024 1848  Gross per 24 hour   Intake 2900 ml   Output --   Net 2900 ml         PHYSICAL EXAM:      General Appearance:   Alert, cooperative, no distress, appears stated age    HEENT:   Normocephalic, atraumatic    Neck:  Supple, symmetrical, trachea midline, no adenopathy;    thyroid: no enlargement/tenderness/nodules; no carotid  bruit or JVD    Lungs:   Clear to auscultation bilaterally; no rales, rhonchi or wheezing; respirations unlabored    Heart::   S1 and S2 normal; regular rate and rhythm; no murmur, rub, or gallop.   Abdomen:   Soft, +epigastric TTP, non-distended; normal bowel sounds; no masses, no organomegaly    Genitalia:   Deferred    Rectal:   Deferred    Extremities:  No cyanosis, clubbing or edema    Pulses:  2+ and symmetric all extremities    Skin:  Skin color, texture, turgor normal, no rashes or lesions    Lymph nodes:  No palpable cervical, axillary or inguinal lymphadenopathy        Lab Results:   Admission on 11/15/2024   Component Date Value    Sodium 11/15/2024 141     Potassium 11/15/2024 4.0     Chloride 11/15/2024 104     CO2 11/15/2024 30     ANION GAP 11/15/2024 7     BUN 11/15/2024 16     Creatinine 11/15/2024 1.21     Glucose 11/15/2024 150 (H)     Calcium 11/15/2024 10.6 (H)     AST 11/15/2024 665 (H)     ALT 11/15/2024 613 (H)     Alkaline Phosphatase 11/15/2024 142 (H)     Total Protein 11/15/2024 7.3     Albumin 11/15/2024 4.2     Total Bilirubin 11/15/2024 3.30 (H)     eGFR 11/15/2024 58     WBC 11/15/2024 13.05 " (H)     RBC 11/15/2024 5.81 (H)     Hemoglobin 11/15/2024 16.2     Hematocrit 11/15/2024 52.0 (H)     MCV 11/15/2024 90     MCH 11/15/2024 27.9     MCHC 11/15/2024 31.2 (L)     RDW 11/15/2024 13.2     MPV 11/15/2024 8.9     Platelets 11/15/2024 346     nRBC 11/15/2024 0     Segmented % 11/15/2024 87 (H)     Immature Grans % 11/15/2024 1     Lymphocytes % 11/15/2024 7 (L)     Monocytes % 11/15/2024 4     Eosinophils Relative 11/15/2024 1     Basophils Relative 11/15/2024 0     Absolute Neutrophils 11/15/2024 11.42 (H)     Absolute Immature Grans 11/15/2024 0.07     Absolute Lymphocytes 11/15/2024 0.95     Absolute Monocytes 11/15/2024 0.54     Eosinophils Absolute 11/15/2024 0.06     Basophils Absolute 11/15/2024 0.01     Lipase 11/15/2024 >12,000 (H)     hs TnI 0hr 11/15/2024 5     Ventricular Rate 11/15/2024 91     Atrial Rate 11/15/2024 91     WI Interval 11/15/2024 146     QRSD Interval 11/15/2024 86     QT Interval 11/15/2024 352     QTC Interval 11/15/2024 432     P Axis 11/15/2024 54     QRS Axis 11/15/2024 2     T Wave [place] 11/15/2024 -36     hs TnI 2hr 11/15/2024 9     Delta 2hr hsTnI 11/15/2024 4     hs TnI 4hr 11/15/2024 11     Delta 4hr hsTnI 11/15/2024 6     Blood Culture 11/15/2024 Received in Microbiology Lab. Culture in Progress.     Blood Culture 11/15/2024 Received in Microbiology Lab. Culture in Progress.     Sodium 11/16/2024 142     Potassium 11/16/2024 4.2     Chloride 11/16/2024 108     CO2 11/16/2024 27     ANION GAP 11/16/2024 7     BUN 11/16/2024 17     Creatinine 11/16/2024 1.27     Glucose 11/16/2024 112     Calcium 11/16/2024 9.4     AST 11/16/2024 556 (H)     ALT 11/16/2024 692 (H)     Alkaline Phosphatase 11/16/2024 138 (H)     Total Protein 11/16/2024 6.5     Albumin 11/16/2024 3.8     Total Bilirubin 11/16/2024 4.39 (H)     eGFR 11/16/2024 54     WBC 11/16/2024 12.46 (H)     RBC 11/16/2024 5.32     Hemoglobin 11/16/2024 15.0     Hematocrit 11/16/2024 48.0     MCV 11/16/2024 90     MCH  11/16/2024 28.2     MCHC 11/16/2024 31.3 (L)     RDW 11/16/2024 13.5     MPV 11/16/2024 8.9     Platelets 11/16/2024 283     nRBC 11/16/2024 0     Segmented % 11/16/2024 90 (H)     Immature Grans % 11/16/2024 0     Lymphocytes % 11/16/2024 5 (L)     Monocytes % 11/16/2024 5     Eosinophils Relative 11/16/2024 0     Basophils Relative 11/16/2024 0     Absolute Neutrophils 11/16/2024 11.05 (H)     Absolute Immature Grans 11/16/2024 0.03     Absolute Lymphocytes 11/16/2024 0.65     Absolute Monocytes 11/16/2024 0.66     Eosinophils Absolute 11/16/2024 0.04     Basophils Absolute 11/16/2024 0.03     Magnesium 11/16/2024 1.9     Phosphorus 11/16/2024 3.3        Imaging Studies: I have personally reviewed pertinent imaging studies.      Dora Jordan PA-C  11/16/2024,10:23 AM

## 2024-11-16 NOTE — ASSESSMENT & PLAN NOTE
Presenting with abdominal pain  CTAP with cholelithiasis and concern for acute cholecystitis  Continue ceftriaxone and Flagyl  Surgery consulted, appreciate input  Plan for cholecystectomy on this admission if pancreatitis resolves promptly per surgery

## 2024-11-16 NOTE — ASSESSMENT & PLAN NOTE
Transaminitis    AVSS, WBC 14.7 from 12.4, T bilirubin 5 from 4.3    Plan:  MRCP negative. Will monitor labs and plan for possible laparoscopic cholecystectomy once pancreatitis resolves. Anticipate at least 48 hours before surgical intervention. May require interval surgery.   Continue with diet NPO at high-volume IV fluids for pancreatitis  Analgesia and antiemetics, as needed  Monitor for evidence of cholangitis  Monitor labs and vitals and trend LFTs for improvement  Serial abdominal exams  DVT prophylaxis  Encourage ambulation and incentive spirometry  Remainder of care per primary team  General Surgery will continue to follow

## 2024-11-16 NOTE — CONSULTS
"Consultation - Surgery-General   Name: Alonzo Babin 73 y.o. male I MRN: [id number]  Unit/Bed#: -01 I Date of Admission: 11/15/2024   Date of Service: 11/16/2024 I Hospital Day: 1   Inpatient consult to Acute Care Surgery  Consult performed by: Karena Perez PA-C  Consult ordered by: Fady Garay MD        Physician Requesting Evaluation: Fady Garay MD   Reason for Evaluation / Principal Problem: Gallstone pancreatitis  { ?Quick Links I Problem List I PORCH I Billing Tip:09021}  Assessment & Plan  Gallstone pancreatitis  Transaminitis  73y M pw epigastric pain, nausea and vomiting  -Patient afebrile and vital signs stable on present  -Patient with leukocytosis of 12.46, hemoglobin 15  -LFTs elevated with , , alk phos 142, and total bilirubin 3.30  -Lipase elevated at 12,000    Plan:  Patient will likely need cholecystectomy during this admission.  Will await further evaluation from GI given potential choledocholithiasis.  Recommend right upper quadrant ultrasound versus MRI/MRCP  Continue with diet NPO at high-volume IV fluids for pancreatitis  Analgesia and antiemetics, as needed  Monitor for evidence of cholangitis  Monitor labs and vitals and trend LFTs for improvement  Serial abdominal exams  DVT prophylaxis  Encourage ambulation and incentive spirometry  Remainder of care per primary team  General Surgery will continue to follow  HTN (hypertension)  Management per primary team  I have discussed the above management plan in detail with the primary service.   Please contact the SecureChat role for the Surgery-General service with any questions/concerns.    History of Present Illness   Alonzo Babin is a 73 y.o. male who presents with ***.    Review of Systems  {Select to insert medical history sections:74354::\"I have reviewed the patient's PMH, PSH, Social History, Family History, Meds, and Allergies\"}    Objective :{?Quick Links I ICU Summary I Vitals I I/Os I LDAs I Mobility (PT/OT) " I Code Status / ACP   ?Quick Links I Active Meds I Pain Meds I Antibiotics I Anticoagulants:44211}  Temp:  [96 °F (35.6 °C)-98.8 °F (37.1 °C)] 98.8 °F (37.1 °C)  HR:  [] 86  BP: (120-164)/(74-93) 149/92  Resp:  [17-32] 18  SpO2:  [92 %-97 %] 96 %  O2 Device: None (Room air)      Physical Exam  ***  {?Quick Links I Lab Review I Micro Results I Radiology I Cardiology:75746}  Lab Results: I have reviewed the following results:  Recent Labs     11/15/24  1330 11/15/24  1623 11/16/24  0439   WBC 13.05*  --  12.46*   HGB 16.2  --  15.0   HCT 52.0*  --  48.0     --  283   SODIUM 141  --   --    K 4.0  --   --      --   --    CO2 30  --   --    BUN 16  --   --    CREATININE 1.21  --   --    GLUC 150*  --   --    *  --   --    *  --   --    ALB 4.2  --   --    TBILI 3.30*  --   --    ALKPHOS 142*  --   --    HSTNI0 5  --   --    HSTNI2  --  9  --        Imaging Results Review: I reviewed radiology reports from this admission including: CT abdomen/pelvis.  Other Study Results Review: No additional pertinent studies reviewed.    VTE Pharmacologic Prophylaxis: VTE covered by:  enoxaparin, Subcutaneous     VTE Mechanical Prophylaxis: sequential compression device    Karena Perez  11/16/2024

## 2024-11-16 NOTE — PLAN OF CARE
Problem: PAIN - ADULT  Goal: Verbalizes/displays adequate comfort level or baseline comfort level  Description: Interventions:  - Encourage patient to monitor pain and request assistance  - Assess pain using appropriate pain scale  - Administer analgesics based on type and severity of pain and evaluate response  - Implement non-pharmacological measures as appropriate and evaluate response  - Consider cultural and social influences on pain and pain management  - Notify physician/advanced practitioner if interventions unsuccessful or patient reports new pain  Outcome: Progressing     Problem: INFECTION - ADULT  Goal: Absence or prevention of progression during hospitalization  Description: INTERVENTIONS:  - Assess and monitor for signs and symptoms of infection  - Monitor lab/diagnostic results  - Monitor all insertion sites, i.e. indwelling lines, tubes, and drains  - Monitor endotracheal if appropriate and nasal secretions for changes in amount and color  - Potter appropriate cooling/warming therapies per order  - Administer medications as ordered  - Instruct and encourage patient and family to use good hand hygiene technique  - Identify and instruct in appropriate isolation precautions for identified infection/condition  Outcome: Progressing  Goal: Absence of fever/infection during neutropenic period  Description: INTERVENTIONS:  - Monitor WBC    Outcome: Progressing     Problem: SAFETY ADULT  Goal: Patient will remain free of falls  Description: INTERVENTIONS:  - Educate patient/family on patient safety including physical limitations  - Instruct patient to call for assistance with activity   - Consult OT/PT to assist with strengthening/mobility   - Keep Call bell within reach  - Keep bed low and locked with side rails adjusted as appropriate  - Keep care items and personal belongings within reach  - Initiate and maintain comfort rounds  - Make Fall Risk Sign visible to staff  - Offer Toileting every  Hours,  in advance of need  - Initiate/Maintainalarm  - Obtain necessary fall risk management equipment:   - Apply yellow socks and bracelet for high fall risk patients  - Consider moving patient to room near nurses station  Outcome: Progressing  Goal: Maintain or return to baseline ADL function  Description: INTERVENTIONS:  -  Assess patient's ability to carry out ADLs; assess patient's baseline for ADL function and identify physical deficits which impact ability to perform ADLs (bathing, care of mouth/teeth, toileting, grooming, dressing, etc.)  - Assess/evaluate cause of self-care deficits   - Assess range of motion  - Assess patient's mobility; develop plan if impaired  - Assess patient's need for assistive devices and provide as appropriate  - Encourage maximum independence but intervene and supervise when necessary  - Involve family in performance of ADLs  - Assess for home care needs following discharge   - Consider OT consult to assist with ADL evaluation and planning for discharge  - Provide patient education as appropriate  Outcome: Progressing  Goal: Maintains/Returns to pre admission functional level  Description: INTERVENTIONS:  - Perform AM-PAC 6 Click Basic Mobility/ Daily Activity assessment daily.  - Set and communicate daily mobility goal to care team and patient/family/caregiver.   - Collaborate with rehabilitation services on mobility goals if consulted  - Perform Range of Motion  times a day.  - Reposition patient every  hours.  - Dangle patient  times a day  - Stand patient  times a day  - Ambulate patient times a day  - Out of bed to chair  times a day   - Out of bed for meal times a day  - Out of bed for toileting  - Record patient progress and toleration of activity level   Outcome: Progressing     Problem: DISCHARGE PLANNING  Goal: Discharge to home or other facility with appropriate resources  Description: INTERVENTIONS:  - Identify barriers to discharge w/patient and caregiver  - Arrange for  needed discharge resources and transportation as appropriate  - Identify discharge learning needs (meds, wound care, etc.)  - Arrange for interpretive services to assist at discharge as needed  - Refer to Case Management Department for coordinating discharge planning if the patient needs post-hospital services based on physician/advanced practitioner order or complex needs related to functional status, cognitive ability, or social support system  Outcome: Progressing     Problem: Knowledge Deficit  Goal: Patient/family/caregiver demonstrates understanding of disease process, treatment plan, medications, and discharge instructions  Description: Complete learning assessment and assess knowledge base.  Interventions:  - Provide teaching at level of understanding  - Provide teaching via preferred learning methods  Outcome: Progressing

## 2024-11-16 NOTE — CONSULTS
Consult- General Surgery   Alonzo Babin 73 y.o. male MRN: [id number]  Unit/Bed#: -01 Encounter: [id number]      Assessment & Plan     Alonzo Babin is a 73 y.o. male with gallstone pancreatitis.  Transaminitis    AVSS, WBC 12.4 from 13, AST//192, T bilirubin 4.39 from 3.3, lipase greater than 12,000     Plan:  GI team planning for MRI today and likely ERCP on Monday 11/18.  Given patient's clinical status, possible laparoscopic cholecystectomy this admission following GI intervention.  Continue with diet NPO at high-volume IV fluids for pancreatitis  Analgesia and antiemetics, as needed  Monitor for evidence of cholangitis  Monitor labs and vitals and trend LFTs for improvement  Serial abdominal exams  DVT prophylaxis  Encourage ambulation and incentive spirometry  Remainder of care per primary team  General Surgery will continue to follow    History of Present Illness     HPI:  Alonzo Babin is a 73 y.o. male with a PMH of hypertension, kidney stones, peptic ulcer disease who presents with abdominal pain.  Patient reports abdominal pain began shortly after eating dinner last night.  Followed by nausea and vomiting.  No history of similar symptoms.  Reports his abdominal pain mainly in the epigastric region.  He denies any further nausea or vomiting this morning. The patient denies CP, SOB, palpitations, headache, fever, chills, unintentional weight loss, night sweats, constipation, diarrhea.    Review of Systems   Constitutional:  Negative for chills and fever.   HENT:  Negative for ear pain and sore throat.    Eyes:  Negative for pain and visual disturbance.   Respiratory:  Negative for cough and shortness of breath.    Cardiovascular:  Negative for chest pain and palpitations.   Gastrointestinal:  Positive for abdominal pain, nausea and vomiting. Negative for blood in stool, constipation and diarrhea.   Genitourinary:  Negative for dysuria and hematuria.   Musculoskeletal:  Negative for  "arthralgias and back pain.   Skin:  Negative for color change and rash.   Neurological:  Negative for seizures and syncope.   All other systems reviewed and are negative.      Historical Information   Past Medical History:   Diagnosis Date    Hypertension     Kidney stones     Peptic ulceration      Past Surgical History:   Procedure Laterality Date    APPENDECTOMY       Social History   Social History     Substance and Sexual Activity   Alcohol Use Not Currently     Social History     Substance and Sexual Activity   Drug Use Not on file     Social History     Tobacco Use   Smoking Status Former    Types: Cigarettes   Smokeless Tobacco Never     Family History: Family history non-contributory    Meds/Allergies   all medications and allergies reviewed  No Known Allergies    Objective   First Vitals:   Blood Pressure: 123/88 (11/15/24 1309)  Pulse: 90 (11/15/24 1309)  Temperature: 97.7 °F (36.5 °C) (11/15/24 1309)  Temp Source: Oral (11/15/24 1309)  Respirations: (!) 30 (11/15/24 1309)  Height: 5' 7\" (170.2 cm) (11/15/24 1311)  Weight - Scale: 100 kg (221 lb 1.9 oz) (11/15/24 1311)  SpO2: 94 % (11/15/24 1309)    Current Vitals:   Blood Pressure: 150/90 (11/16/24 1059)  Pulse: 88 (11/16/24 1059)  Temperature: 98.8 °F (37.1 °C) (11/15/24 2241)  Temp Source: Oral (11/15/24 2003)  Respirations: 18 (11/15/24 2003)  Height: 5' 7\" (170.2 cm) (11/15/24 2003)  Weight - Scale: 100 kg (220 lb 7.4 oz) (11/15/24 2003)  SpO2: 96 % (11/16/24 1059)      Intake/Output Summary (Last 24 hours) at 11/16/2024 1201  Last data filed at 11/15/2024 1848  Gross per 24 hour   Intake 2900 ml   Output --   Net 2900 ml       Invasive Devices       Peripheral Intravenous Line  Duration             Peripheral IV 11/15/24 Distal;Left;Upper;Ventral (anterior) Arm <1 day                    Physical Exam  Vitals reviewed.   Constitutional:       General: He is not in acute distress.     Appearance: Normal appearance. He is obese. He is not ill-appearing " or toxic-appearing.   HENT:      Head: Normocephalic and atraumatic.      Right Ear: External ear normal.      Left Ear: External ear normal.      Nose: Nose normal.      Mouth/Throat:      Mouth: Mucous membranes are moist.   Eyes:      General: No scleral icterus.        Right eye: No discharge.         Left eye: No discharge.      Conjunctiva/sclera: Conjunctivae normal.   Cardiovascular:      Rate and Rhythm: Normal rate and regular rhythm.   Pulmonary:      Effort: Pulmonary effort is normal. No respiratory distress.   Abdominal:      General: There is no distension.      Palpations: Abdomen is soft.      Tenderness: There is abdominal tenderness (epigastric). There is no guarding.   Musculoskeletal:         General: Normal range of motion.      Cervical back: Normal range of motion.   Skin:     General: Skin is warm.      Coloration: Skin is not jaundiced.   Neurological:      General: No focal deficit present.      Mental Status: He is alert and oriented to person, place, and time.   Psychiatric:         Mood and Affect: Mood normal.         Behavior: Behavior normal.         Thought Content: Thought content normal.         Judgment: Judgment normal.         Lab Results: I have personally reviewed pertinent lab results.    Recent Results (from the past 36 hours)   Comprehensive metabolic panel    Collection Time: 11/15/24  1:30 PM   Result Value Ref Range    Sodium 141 135 - 147 mmol/L    Potassium 4.0 3.5 - 5.3 mmol/L    Chloride 104 96 - 108 mmol/L    CO2 30 21 - 32 mmol/L    ANION GAP 7 4 - 13 mmol/L    BUN 16 5 - 25 mg/dL    Creatinine 1.21 0.60 - 1.30 mg/dL    Glucose 150 (H) 65 - 140 mg/dL    Calcium 10.6 (H) 8.4 - 10.2 mg/dL     (H) 13 - 39 U/L     (H) 7 - 52 U/L    Alkaline Phosphatase 142 (H) 34 - 104 U/L    Total Protein 7.3 6.4 - 8.4 g/dL    Albumin 4.2 3.5 - 5.0 g/dL    Total Bilirubin 3.30 (H) 0.20 - 1.00 mg/dL    eGFR 58 ml/min/1.73sq m   CBC and differential    Collection Time:  "11/15/24  1:30 PM   Result Value Ref Range    WBC 13.05 (H) 4.31 - 10.16 Thousand/uL    RBC 5.81 (H) 3.88 - 5.62 Million/uL    Hemoglobin 16.2 12.0 - 17.0 g/dL    Hematocrit 52.0 (H) 36.5 - 49.3 %    MCV 90 82 - 98 fL    MCH 27.9 26.8 - 34.3 pg    MCHC 31.2 (L) 31.4 - 37.4 g/dL    RDW 13.2 11.6 - 15.1 %    MPV 8.9 8.9 - 12.7 fL    Platelets 346 149 - 390 Thousands/uL    nRBC 0 /100 WBCs    Segmented % 87 (H) 43 - 75 %    Immature Grans % 1 0 - 2 %    Lymphocytes % 7 (L) 14 - 44 %    Monocytes % 4 4 - 12 %    Eosinophils Relative 1 0 - 6 %    Basophils Relative 0 0 - 1 %    Absolute Neutrophils 11.42 (H) 1.85 - 7.62 Thousands/µL    Absolute Immature Grans 0.07 0.00 - 0.20 Thousand/uL    Absolute Lymphocytes 0.95 0.60 - 4.47 Thousands/µL    Absolute Monocytes 0.54 0.17 - 1.22 Thousand/µL    Eosinophils Absolute 0.06 0.00 - 0.61 Thousand/µL    Basophils Absolute 0.01 0.00 - 0.10 Thousands/µL   Lipase    Collection Time: 11/15/24  1:30 PM   Result Value Ref Range    Lipase >12,000 (H) 11 - 82 u/L   HS Troponin 0hr (reflex protocol)    Collection Time: 11/15/24  1:30 PM   Result Value Ref Range    hs TnI 0hr 5 \"Refer to ACS Flowchart\"- see link ng/L   ECG 12 lead    Collection Time: 11/15/24  1:36 PM   Result Value Ref Range    Ventricular Rate 91 BPM    Atrial Rate 91 BPM    IL Interval 146 ms    QRSD Interval 86 ms    QT Interval 352 ms    QTC Interval 432 ms    P Ursa 54 degrees    QRS Axis 2 degrees    T Wave Axis -36 degrees   Blood culture #1    Collection Time: 11/15/24  4:15 PM    Specimen: Arm, Right; Blood   Result Value Ref Range    Blood Culture Received in Microbiology Lab. Culture in Progress.    HS Troponin I 2hr    Collection Time: 11/15/24  4:23 PM   Result Value Ref Range    hs TnI 2hr 9 \"Refer to ACS Flowchart\"- see link ng/L    Delta 2hr hsTnI 4 <20 ng/L   Blood culture #2    Collection Time: 11/15/24  4:23 PM    Specimen: Arm, Left; Blood   Result Value Ref Range    Blood Culture Received in " "Microbiology Lab. Culture in Progress.    HS Troponin I 4hr    Collection Time: 11/15/24  5:27 PM   Result Value Ref Range    hs TnI 4hr 11 \"Refer to ACS Flowchart\"- see link ng/L    Delta 4hr hsTnI 6 <20 ng/L   Comprehensive metabolic panel    Collection Time: 11/16/24  4:39 AM   Result Value Ref Range    Sodium 142 135 - 147 mmol/L    Potassium 4.2 3.5 - 5.3 mmol/L    Chloride 108 96 - 108 mmol/L    CO2 27 21 - 32 mmol/L    ANION GAP 7 4 - 13 mmol/L    BUN 17 5 - 25 mg/dL    Creatinine 1.27 0.60 - 1.30 mg/dL    Glucose 112 65 - 140 mg/dL    Calcium 9.4 8.4 - 10.2 mg/dL     (H) 13 - 39 U/L     (H) 7 - 52 U/L    Alkaline Phosphatase 138 (H) 34 - 104 U/L    Total Protein 6.5 6.4 - 8.4 g/dL    Albumin 3.8 3.5 - 5.0 g/dL    Total Bilirubin 4.39 (H) 0.20 - 1.00 mg/dL    eGFR 54 ml/min/1.73sq m   CBC and differential    Collection Time: 11/16/24  4:39 AM   Result Value Ref Range    WBC 12.46 (H) 4.31 - 10.16 Thousand/uL    RBC 5.32 3.88 - 5.62 Million/uL    Hemoglobin 15.0 12.0 - 17.0 g/dL    Hematocrit 48.0 36.5 - 49.3 %    MCV 90 82 - 98 fL    MCH 28.2 26.8 - 34.3 pg    MCHC 31.3 (L) 31.4 - 37.4 g/dL    RDW 13.5 11.6 - 15.1 %    MPV 8.9 8.9 - 12.7 fL    Platelets 283 149 - 390 Thousands/uL    nRBC 0 /100 WBCs    Segmented % 90 (H) 43 - 75 %    Immature Grans % 0 0 - 2 %    Lymphocytes % 5 (L) 14 - 44 %    Monocytes % 5 4 - 12 %    Eosinophils Relative 0 0 - 6 %    Basophils Relative 0 0 - 1 %    Absolute Neutrophils 11.05 (H) 1.85 - 7.62 Thousands/µL    Absolute Immature Grans 0.03 0.00 - 0.20 Thousand/uL    Absolute Lymphocytes 0.65 0.60 - 4.47 Thousands/µL    Absolute Monocytes 0.66 0.17 - 1.22 Thousand/µL    Eosinophils Absolute 0.04 0.00 - 0.61 Thousand/µL    Basophils Absolute 0.03 0.00 - 0.10 Thousands/µL   Magnesium    Collection Time: 11/16/24  4:39 AM   Result Value Ref Range    Magnesium 1.9 1.9 - 2.7 mg/dL   Phosphorus    Collection Time: 11/16/24  4:39 AM   Result Value Ref Range    Phosphorus " 3.3 2.3 - 4.1 mg/dL     Imaging: Results Review Statement: I reviewed radiology reports from this admission including: CT abdomen/pelvis.  CT abdomen pelvis with contrast  Result Date: 11/15/2024  Impression: Cholelithiasis. Inflammatory changes around the gallbladder suspicious for acute cholecystitis. Recommend correlation with clinical exam and right upper quadrant ultrasound. Inflammatory changes around the pancreatic head which may be reactive, although a concomitant mild acute pancreatitis is not excluded. Hepatic steatosis. Findings communicated to XAVIER LEHMAN on 11/15/2024 3:43 PM. Workstation performed: [id number]     XR chest 2 views  Result Date: 11/15/2024  Impression: No acute cardiopulmonary disease. Workstation performed: [id number]       EKG, Pathology, and Other Studies: Results Review Statement: No pertinent imaging studies reviewed.

## 2024-11-16 NOTE — PROGRESS NOTES
Progress Note - Hospitalist   Name: Alonzo Babin 73 y.o. male I MRN: [id number]  Unit/Bed#: -01 I Date of Admission: 11/15/2024   Date of Service: 11/16/2024 I Hospital Day: 1    Assessment & Plan  Gallstone pancreatitis  Patient presenting with abdominal pain initially went to urgent care was sent here for further evaluation  CTAP showed cholelithiasis. Inflammatory changes around the gallbladder suspicious for acute cholecystitis. Possible pancreatitis  No etoh  Lipase noted to be elevated greater than 12,000.  Elevated AST 65, , alk phos 142  Lipase down trended to 2,152  Blood cultures x 2.  1 set with preliminary result growth of gram-positive cocci in clusters.  Second set no growth  Continue IVF resuscitation at 200 mL an hour with plan to reduce tomorrow  Pain control and antiemetics.   N.p.o.  Continue ceftriaxone and Flagyl  GI consulted, recommendations appreciated  MRI/MRCP  Serial abdominal examination    Cholecystitis  Presenting with abdominal pain  CTAP with cholelithiasis and concern for acute cholecystitis  Continue ceftriaxone and Flagyl  Surgery consulted, appreciate input  Plan for cholecystectomy on this admission if pancreatitis resolves promptly per surgery  HTN (hypertension)  Takes enalapril 5 mg PTA  Lisinopril substituted for enalapril    VTE Pharmacologic Prophylaxis: VTE Score: 4 Moderate Risk (Score 3-4) - Pharmacological DVT Prophylaxis Ordered: enoxaparin (Lovenox).    Mobility:   Basic Mobility Inpatient Raw Score: 24  JH-HLM Goal: 8: Walk 250 feet or more  JH-HLM Achieved: 1: Laying in bed  JH-HLM Goal achieved. Continue to encourage appropriate mobility.    Patient Centered Rounds: I performed bedside rounds with nursing staff today.   Discussions with Specialists or Other Care Team Provider: GI    Education and Discussions with Family / Patient: Updated  (wife) at bedside.    Current Length of Stay: 1 day(s)  Current Patient Status: Inpatient    Certification Statement: The patient will continue to require additional inpatient hospital stay due to MRI/MRCP/ERCP  Discharge Plan: Anticipate discharge in 48 hrs to home.    Code Status: Level 1 - Full Code    Subjective   Seen and examined with wife at bedside.  Patient reported ongoing abdominal pain improved with administration of Dilaudid.  Reported overnight vomiting x 2.  Denies chest pain, shortness of breath, dizziness or lightheadedness.      Objective :  Temp:  [98.6 °F (37 °C)-98.8 °F (37.1 °C)] 98.8 °F (37.1 °C)  HR:  [75-88] 88  BP: (120-164)/(74-93) 150/90  Resp:  [17-21] 18  SpO2:  [92 %-97 %] 96 %  O2 Device: None (Room air)    Body mass index is 34.53 kg/m².     Input and Output Summary (last 24 hours):     Intake/Output Summary (Last 24 hours) at 11/16/2024 1431  Last data filed at 11/15/2024 1848  Gross per 24 hour   Intake 2900 ml   Output --   Net 2900 ml       Physical Exam  Vitals and nursing note reviewed.   Constitutional:       General: He is not in acute distress.     Appearance: He is well-developed.   HENT:      Head: Normocephalic and atraumatic.   Eyes:      Conjunctiva/sclera: Conjunctivae normal.   Cardiovascular:      Rate and Rhythm: Normal rate and regular rhythm.      Heart sounds: No murmur heard.  Pulmonary:      Effort: Pulmonary effort is normal. No respiratory distress.      Breath sounds: Normal breath sounds.   Abdominal:      General: There is no distension.      Palpations: Abdomen is soft.      Tenderness: There is abdominal tenderness. There is no guarding.   Musculoskeletal:         General: No swelling.      Cervical back: Neck supple.   Skin:     General: Skin is warm and dry.      Capillary Refill: Capillary refill takes less than 2 seconds.   Neurological:      Mental Status: He is alert and oriented to person, place, and time.   Psychiatric:         Mood and Affect: Mood normal.           Lines/Drains:              Lab Results: I have reviewed the following  results:   Results from last 7 days   Lab Units 11/16/24  0439   WBC Thousand/uL 12.46*   HEMOGLOBIN g/dL 15.0   HEMATOCRIT % 48.0   PLATELETS Thousands/uL 283   SEGS PCT % 90*   LYMPHO PCT % 5*   MONO PCT % 5   EOS PCT % 0     Results from last 7 days   Lab Units 11/16/24  0439   SODIUM mmol/L 142   POTASSIUM mmol/L 4.2   CHLORIDE mmol/L 108   CO2 mmol/L 27   BUN mg/dL 17   CREATININE mg/dL 1.27   ANION GAP mmol/L 7   CALCIUM mg/dL 9.4   ALBUMIN g/dL 3.8   TOTAL BILIRUBIN mg/dL 4.39*   ALK PHOS U/L 138*   ALT U/L 692*   AST U/L 556*   GLUCOSE RANDOM mg/dL 112                       Recent Cultures (last 7 days):   Results from last 7 days   Lab Units 11/15/24  1623 11/15/24  1615   BLOOD CULTURE  Received in Microbiology Lab. Culture in Progress.  --    GRAM STAIN RESULT   --  Gram positive cocci in clusters*       Imaging Results Review: I reviewed radiology reports from this admission including: chest xray and CT abdomen/pelvis.      Last 24 Hours Medication List:     Current Facility-Administered Medications:     cefTRIAXone (ROCEPHIN) 1,000 mg in dextrose 5 % 50 mL IVPB, Q24H    enoxaparin (LOVENOX) subcutaneous injection 40 mg, Daily    HYDROmorphone (DILAUDID) injection 0.5 mg, Q2H PRN    HYDROmorphone (DILAUDID) injection 1 mg, Q4H PRN    lactated ringers infusion, Continuous, Last Rate: 200 mL/hr (11/16/24 1218)    lisinopril (ZESTRIL) tablet 10 mg, Daily    metroNIDAZOLE (FLAGYL) IVPB (premix) 500 mg 100 mL, Q8H, Last Rate: 500 mg (11/16/24 0841)    Administrative Statements   Today, Patient Was Seen By: RADHA Yeager      **Please Note: This note may have been constructed using a voice recognition system.**

## 2024-11-16 NOTE — H&P
H&P - Hospitalist   Name: Alonzo Babin 73 y.o. male I MRN: [id number]  Unit/Bed#: -01 I Date of Admission: 11/15/2024   Date of Service: 11/15/2024 I Hospital Day: 0     Assessment & Plan  Gallstone pancreatitis  Patient presenting with abdominal pain initially went to urgent care was sent here for further evaluation  CTAP showed cholelithiasis. Inflammatory changes around the gallbladder suspicious for acute cholecystitis. Possible pancreatitis  No etoh  Lipase noted to be elevated greater than 12,000.  Elevated AST 65, , alk phos 142  Fluids, pain control.   Clear liquid diet  GI consulted  Surgery notified and will see the patient tomorrow   Cholecystitis  Presenting with abdominal pain  CTAP with cholelithiasis and concern for acute cholecystitis  Covered with antibiotics continue ceftriaxone and Flagyl  Surgery consulted and will see the patient tomorrow  HTN (hypertension)  Hold hold home ACE inhibitor as blood pressures are at the lower end of normal      VTE Pharmacologic Prophylaxis: VTE Score: 4 Moderate Risk (Score 3-4) - Pharmacological DVT Prophylaxis Ordered: enoxaparin (Lovenox).  Code Status: Level 1 - Full Code dw pt  Discussion with family: Patient declined call to .     Anticipated Length of Stay: Patient will be admitted on an inpatient basis with an anticipated length of stay of greater than 2 midnights secondary to pancreatitis, cholecystitis .    History of Present Illness   Chief Complaint: epigastric abd pain    Alonzo Babin is a 73 y.o. male with a PMH of hypertension who presents with epigastric abdominal pain.  Was found to have pancreatitis and cholecystitis on imaging.  Was covered with antibiotics.  Pain is improving overall.  No nausea or vomiting.  No previous episodes of pancreatitis, no alcohol use.  Surgery was consulted and will see the patient tomorrow.    Review of Systems  10 pt review of systems reviewed with patient and pertinent  positive/negatives as per HPI.    Historical Information   Past Medical History:   Diagnosis Date    Hypertension     Kidney stones     Peptic ulceration      Past Surgical History:   Procedure Laterality Date    APPENDECTOMY       Social History     Tobacco Use    Smoking status: Former     Types: Cigarettes    Smokeless tobacco: Never   Substance and Sexual Activity    Alcohol use: Not Currently    Drug use: Not on file    Sexual activity: Not on file     E-Cigarette/Vaping     E-Cigarette/Vaping Substances     History reviewed. No pertinent family history.  Social History:  Marital Status: /Civil Union   Occupation: none  Patient Pre-hospital Living Situation: Home  Patient Pre-hospital Level of Mobility: walks  Patient Pre-hospital Diet Restrictions: none    Meds/Allergies   I have reviewed home medications with patient personally.  Prior to Admission medications    Medication Sig Start Date End Date Taking? Authorizing Provider   enalapril (VASOTEC) 5 mg tablet Take 5 mg by mouth daily    Historical Provider, MD   pantoprazole (PROTONIX) 40 mg tablet Take 40 mg by mouth daily    Historical Provider, MD     No Known Allergies    Objective :  Temp:  [96 °F (35.6 °C)-98.8 °F (37.1 °C)] 98.8 °F (37.1 °C)  HR:  [] 86  BP: (120-164)/(74-93) 149/92  Resp:  [17-32] 18  SpO2:  [92 %-97 %] 96 %  O2 Device: None (Room air)    Physical Exam   Nad  Nonlabored resp  RRR  Epigastric TTP  aaox3    Lines/Drains:            Lab Results: I have reviewed the following results:  Results from last 7 days   Lab Units 11/15/24  1330   WBC Thousand/uL 13.05*   HEMOGLOBIN g/dL 16.2   HEMATOCRIT % 52.0*   PLATELETS Thousands/uL 346   SEGS PCT % 87*   LYMPHO PCT % 7*   MONO PCT % 4   EOS PCT % 1     Results from last 7 days   Lab Units 11/15/24  1330   SODIUM mmol/L 141   POTASSIUM mmol/L 4.0   CHLORIDE mmol/L 104   CO2 mmol/L 30   BUN mg/dL 16   CREATININE mg/dL 1.21   ANION GAP mmol/L 7   CALCIUM mg/dL 10.6*   ALBUMIN g/dL  "4.2   TOTAL BILIRUBIN mg/dL 3.30*   ALK PHOS U/L 142*   ALT U/L 613*   AST U/L 665*   GLUCOSE RANDOM mg/dL 150*             No results found for: \"HGBA1C\"        Imaging Results Review: I reviewed radiology reports from this admission including: CT abdomen/pelvis.  Other Study Results Review: No additional pertinent studies reviewed.    Administrative Statements   I have spent a total time of 40+ minutes in caring for this patient on the day of the visit/encounter including Diagnostic results, Instructions for management, Patient and family education, Importance of tx compliance, and Risk factor reductions.    ** Please Note: This note has been constructed using a voice recognition system. **    "

## 2024-11-17 ENCOUNTER — APPOINTMENT (INPATIENT)
Dept: ULTRASOUND IMAGING | Facility: HOSPITAL | Age: 74
DRG: 439 | End: 2024-11-17
Payer: MEDICARE

## 2024-11-17 LAB
ALBUMIN SERPL BCG-MCNC: 3.6 G/DL (ref 3.5–5)
ALP SERPL-CCNC: 142 U/L (ref 34–104)
ALT SERPL W P-5'-P-CCNC: 463 U/L (ref 7–52)
ANION GAP SERPL CALCULATED.3IONS-SCNC: 10 MMOL/L (ref 4–13)
AST SERPL W P-5'-P-CCNC: 247 U/L (ref 13–39)
BASOPHILS # BLD AUTO: 0.04 THOUSANDS/ÂΜL (ref 0–0.1)
BASOPHILS NFR BLD AUTO: 0 % (ref 0–1)
BILIRUB SERPL-MCNC: 4.99 MG/DL (ref 0.2–1)
BUN SERPL-MCNC: 13 MG/DL (ref 5–25)
CALCIUM SERPL-MCNC: 9.1 MG/DL (ref 8.4–10.2)
CHLORIDE SERPL-SCNC: 107 MMOL/L (ref 96–108)
CO2 SERPL-SCNC: 24 MMOL/L (ref 21–32)
CREAT SERPL-MCNC: 1.07 MG/DL (ref 0.6–1.3)
EOSINOPHIL # BLD AUTO: 0.04 THOUSAND/ÂΜL (ref 0–0.61)
EOSINOPHIL NFR BLD AUTO: 0 % (ref 0–6)
ERYTHROCYTE [DISTWIDTH] IN BLOOD BY AUTOMATED COUNT: 13.9 % (ref 11.6–15.1)
GFR SERPL CREATININE-BSD FRML MDRD: 67 ML/MIN/1.73SQ M
GLUCOSE SERPL-MCNC: 91 MG/DL (ref 65–140)
HCT VFR BLD AUTO: 45.6 % (ref 36.5–49.3)
HGB BLD-MCNC: 14.2 G/DL (ref 12–17)
IMM GRANULOCYTES # BLD AUTO: 0.1 THOUSAND/UL (ref 0–0.2)
IMM GRANULOCYTES NFR BLD AUTO: 1 % (ref 0–2)
LYMPHOCYTES # BLD AUTO: 0.84 THOUSANDS/ÂΜL (ref 0.6–4.47)
LYMPHOCYTES NFR BLD AUTO: 6 % (ref 14–44)
MCH RBC QN AUTO: 28.2 PG (ref 26.8–34.3)
MCHC RBC AUTO-ENTMCNC: 31.1 G/DL (ref 31.4–37.4)
MCV RBC AUTO: 91 FL (ref 82–98)
MONOCYTES # BLD AUTO: 0.88 THOUSAND/ÂΜL (ref 0.17–1.22)
MONOCYTES NFR BLD AUTO: 6 % (ref 4–12)
NEUTROPHILS # BLD AUTO: 12.88 THOUSANDS/ÂΜL (ref 1.85–7.62)
NEUTS SEG NFR BLD AUTO: 87 % (ref 43–75)
NRBC BLD AUTO-RTO: 0 /100 WBCS
PLATELET # BLD AUTO: 264 THOUSANDS/UL (ref 149–390)
PMV BLD AUTO: 9.3 FL (ref 8.9–12.7)
POTASSIUM SERPL-SCNC: 3.8 MMOL/L (ref 3.5–5.3)
PROT SERPL-MCNC: 6.2 G/DL (ref 6.4–8.4)
RBC # BLD AUTO: 5.03 MILLION/UL (ref 3.88–5.62)
SODIUM SERPL-SCNC: 141 MMOL/L (ref 135–147)
WBC # BLD AUTO: 14.78 THOUSAND/UL (ref 4.31–10.16)

## 2024-11-17 PROCEDURE — 99232 SBSQ HOSP IP/OBS MODERATE 35: CPT | Performed by: SURGERY

## 2024-11-17 PROCEDURE — 85025 COMPLETE CBC W/AUTO DIFF WBC: CPT

## 2024-11-17 PROCEDURE — 76705 ECHO EXAM OF ABDOMEN: CPT

## 2024-11-17 PROCEDURE — 99232 SBSQ HOSP IP/OBS MODERATE 35: CPT

## 2024-11-17 PROCEDURE — 80053 COMPREHEN METABOLIC PANEL: CPT

## 2024-11-17 PROCEDURE — 99232 SBSQ HOSP IP/OBS MODERATE 35: CPT | Performed by: INTERNAL MEDICINE

## 2024-11-17 RX ORDER — DEXTROSE MONOHYDRATE, SODIUM CHLORIDE, AND POTASSIUM CHLORIDE 50; 1.49; 4.5 G/1000ML; G/1000ML; G/1000ML
175 INJECTION, SOLUTION INTRAVENOUS CONTINUOUS
Status: DISCONTINUED | OUTPATIENT
Start: 2024-11-17 | End: 2024-11-19 | Stop reason: HOSPADM

## 2024-11-17 RX ORDER — LIDOCAINE 50 MG/G
1 PATCH TOPICAL DAILY
Status: DISCONTINUED | OUTPATIENT
Start: 2024-11-17 | End: 2024-11-19 | Stop reason: HOSPADM

## 2024-11-17 RX ORDER — SIMETHICONE 80 MG
80 TABLET,CHEWABLE ORAL EVERY 6 HOURS PRN
Status: DISCONTINUED | OUTPATIENT
Start: 2024-11-17 | End: 2024-11-19 | Stop reason: HOSPADM

## 2024-11-17 RX ORDER — LIDOCAINE 50 MG/G
1 PATCH TOPICAL DAILY
Status: DISCONTINUED | OUTPATIENT
Start: 2024-11-18 | End: 2024-11-17

## 2024-11-17 RX ORDER — SODIUM CHLORIDE, SODIUM LACTATE, POTASSIUM CHLORIDE, CALCIUM CHLORIDE 600; 310; 30; 20 MG/100ML; MG/100ML; MG/100ML; MG/100ML
125 INJECTION, SOLUTION INTRAVENOUS CONTINUOUS
Status: CANCELLED | OUTPATIENT
Start: 2024-11-17

## 2024-11-17 RX ADMIN — LISINOPRIL 10 MG: 10 TABLET ORAL at 08:20

## 2024-11-17 RX ADMIN — METRONIDAZOLE 500 MG: 500 INJECTION, SOLUTION INTRAVENOUS at 07:38

## 2024-11-17 RX ADMIN — METRONIDAZOLE 500 MG: 500 INJECTION, SOLUTION INTRAVENOUS at 15:01

## 2024-11-17 RX ADMIN — SODIUM CHLORIDE, SODIUM LACTATE, POTASSIUM CHLORIDE, AND CALCIUM CHLORIDE 200 ML/HR: .6; .31; .03; .02 INJECTION, SOLUTION INTRAVENOUS at 07:38

## 2024-11-17 RX ADMIN — SODIUM CHLORIDE, SODIUM LACTATE, POTASSIUM CHLORIDE, AND CALCIUM CHLORIDE 200 ML/HR: .6; .31; .03; .02 INJECTION, SOLUTION INTRAVENOUS at 01:10

## 2024-11-17 RX ADMIN — CEFTRIAXONE SODIUM 1000 MG: 10 INJECTION, POWDER, FOR SOLUTION INTRAVENOUS at 15:54

## 2024-11-17 RX ADMIN — ENOXAPARIN SODIUM 40 MG: 40 INJECTION SUBCUTANEOUS at 08:20

## 2024-11-17 RX ADMIN — DEXTROSE, SODIUM CHLORIDE, AND POTASSIUM CHLORIDE 175 ML/HR: 5; .45; .15 INJECTION INTRAVENOUS at 11:39

## 2024-11-17 RX ADMIN — LIDOCAINE 1 PATCH: 50 PATCH CUTANEOUS at 22:11

## 2024-11-17 RX ADMIN — SIMETHICONE 80 MG: 80 TABLET, CHEWABLE ORAL at 22:11

## 2024-11-17 RX ADMIN — DEXTROSE, SODIUM CHLORIDE, AND POTASSIUM CHLORIDE 175 ML/HR: 5; .45; .15 INJECTION INTRAVENOUS at 18:53

## 2024-11-17 NOTE — PROGRESS NOTES
"Progress Note - Surgery-General   Name: Alonzo Babin 73 y.o. male I MRN: [id number]  Unit/Bed#: [facility] I Date of Admission: 11/15/2024   Date of Service: 11/17/2024 I Hospital Day: 2     Assessment & Plan  Gallstone pancreatitis  Transaminitis    AVSS, WBC 14.7 from 12.4, T bilirubin 5 from 4.3    Plan:  MRCP negative. Will monitor labs and plan for possible laparoscopic cholecystectomy once pancreatitis resolves. Anticipate at least 48 hours before surgical intervention. May require interval surgery.   Continue with diet NPO at high-volume IV fluids for pancreatitis  Analgesia and antiemetics, as needed  Monitor for evidence of cholangitis  Monitor labs and vitals and trend LFTs for improvement  Serial abdominal exams  DVT prophylaxis  Encourage ambulation and incentive spirometry  Remainder of care per primary team  General Surgery will continue to follow  HTN (hypertension)  Management per primary team  Cholecystitis      Subjective/Objective    Subjective: No acute events overnight.     Objective:     Blood pressure 145/97, pulse 83, temperature 99.1 °F (37.3 °C), resp. rate 16, height 5' 7\" (1.702 m), weight 100 kg (220 lb 7.4 oz), SpO2 94%.,Body mass index is 34.53 kg/m².      Intake/Output Summary (Last 24 hours) at 11/17/2024 0854  Last data filed at 11/17/2024 0603  Gross per 24 hour   Intake 2106.67 ml   Output 750 ml   Net 1356.67 ml       Invasive Devices       Peripheral Intravenous Line  Duration             Peripheral IV 11/15/24 Distal;Left;Upper;Ventral (anterior) Arm 1 day                    Physical Exam:   GEN: NAD  HEENT: NCAT, MMM  CV: RRR, no m/r/g  Lung: Normal effort, CTA B/L, no w/r/r  Ab: Soft, distended, TTP in the epigastric region.   Extrem: No CCE   Neuro: A+Ox3     Lab, Imaging and other studies:I have personally reviewed pertinent lab results.     VTE Pharmacologic Prophylaxis: VTE covered by:  enoxaparin, Subcutaneous, 40 mg at 11/17/24 0820     VTE Mechanical Prophylaxis: " sequential compression device    Recent Results (from the past 36 hours)   Comprehensive metabolic panel    Collection Time: 11/16/24  4:39 AM   Result Value Ref Range    Sodium 142 135 - 147 mmol/L    Potassium 4.2 3.5 - 5.3 mmol/L    Chloride 108 96 - 108 mmol/L    CO2 27 21 - 32 mmol/L    ANION GAP 7 4 - 13 mmol/L    BUN 17 5 - 25 mg/dL    Creatinine 1.27 0.60 - 1.30 mg/dL    Glucose 112 65 - 140 mg/dL    Calcium 9.4 8.4 - 10.2 mg/dL     (H) 13 - 39 U/L     (H) 7 - 52 U/L    Alkaline Phosphatase 138 (H) 34 - 104 U/L    Total Protein 6.5 6.4 - 8.4 g/dL    Albumin 3.8 3.5 - 5.0 g/dL    Total Bilirubin 4.39 (H) 0.20 - 1.00 mg/dL    eGFR 54 ml/min/1.73sq m   CBC and differential    Collection Time: 11/16/24  4:39 AM   Result Value Ref Range    WBC 12.46 (H) 4.31 - 10.16 Thousand/uL    RBC 5.32 3.88 - 5.62 Million/uL    Hemoglobin 15.0 12.0 - 17.0 g/dL    Hematocrit 48.0 36.5 - 49.3 %    MCV 90 82 - 98 fL    MCH 28.2 26.8 - 34.3 pg    MCHC 31.3 (L) 31.4 - 37.4 g/dL    RDW 13.5 11.6 - 15.1 %    MPV 8.9 8.9 - 12.7 fL    Platelets 283 149 - 390 Thousands/uL    nRBC 0 /100 WBCs    Segmented % 90 (H) 43 - 75 %    Immature Grans % 0 0 - 2 %    Lymphocytes % 5 (L) 14 - 44 %    Monocytes % 5 4 - 12 %    Eosinophils Relative 0 0 - 6 %    Basophils Relative 0 0 - 1 %    Absolute Neutrophils 11.05 (H) 1.85 - 7.62 Thousands/µL    Absolute Immature Grans 0.03 0.00 - 0.20 Thousand/uL    Absolute Lymphocytes 0.65 0.60 - 4.47 Thousands/µL    Absolute Monocytes 0.66 0.17 - 1.22 Thousand/µL    Eosinophils Absolute 0.04 0.00 - 0.61 Thousand/µL    Basophils Absolute 0.03 0.00 - 0.10 Thousands/µL   Magnesium    Collection Time: 11/16/24  4:39 AM   Result Value Ref Range    Magnesium 1.9 1.9 - 2.7 mg/dL   Phosphorus    Collection Time: 11/16/24  4:39 AM   Result Value Ref Range    Phosphorus 3.3 2.3 - 4.1 mg/dL   Lipase    Collection Time: 11/16/24  4:39 AM   Result Value Ref Range    Lipase 2,158 (H) 11 - 82 u/L    Comprehensive metabolic panel    Collection Time: 11/17/24  5:16 AM   Result Value Ref Range    Sodium 141 135 - 147 mmol/L    Potassium 3.8 3.5 - 5.3 mmol/L    Chloride 107 96 - 108 mmol/L    CO2 24 21 - 32 mmol/L    ANION GAP 10 4 - 13 mmol/L    BUN 13 5 - 25 mg/dL    Creatinine 1.07 0.60 - 1.30 mg/dL    Glucose 91 65 - 140 mg/dL    Calcium 9.1 8.4 - 10.2 mg/dL     (H) 13 - 39 U/L     (H) 7 - 52 U/L    Alkaline Phosphatase 142 (H) 34 - 104 U/L    Total Protein 6.2 (L) 6.4 - 8.4 g/dL    Albumin 3.6 3.5 - 5.0 g/dL    Total Bilirubin 4.99 (H) 0.20 - 1.00 mg/dL    eGFR 67 ml/min/1.73sq m   CBC and differential    Collection Time: 11/17/24  5:16 AM   Result Value Ref Range    WBC 14.78 (H) 4.31 - 10.16 Thousand/uL    RBC 5.03 3.88 - 5.62 Million/uL    Hemoglobin 14.2 12.0 - 17.0 g/dL    Hematocrit 45.6 36.5 - 49.3 %    MCV 91 82 - 98 fL    MCH 28.2 26.8 - 34.3 pg    MCHC 31.1 (L) 31.4 - 37.4 g/dL    RDW 13.9 11.6 - 15.1 %    MPV 9.3 8.9 - 12.7 fL    Platelets 264 149 - 390 Thousands/uL    nRBC 0 /100 WBCs    Segmented % 87 (H) 43 - 75 %    Immature Grans % 1 0 - 2 %    Lymphocytes % 6 (L) 14 - 44 %    Monocytes % 6 4 - 12 %    Eosinophils Relative 0 0 - 6 %    Basophils Relative 0 0 - 1 %    Absolute Neutrophils 12.88 (H) 1.85 - 7.62 Thousands/µL    Absolute Immature Grans 0.10 0.00 - 0.20 Thousand/uL    Absolute Lymphocytes 0.84 0.60 - 4.47 Thousands/µL    Absolute Monocytes 0.88 0.17 - 1.22 Thousand/µL    Eosinophils Absolute 0.04 0.00 - 0.61 Thousand/µL    Basophils Absolute 0.04 0.00 - 0.10 Thousands/µL

## 2024-11-17 NOTE — PROGRESS NOTES
Progress Note - Gastroenterology   Name: Alonzo Ball y.o. male I MRN: [id number]  Unit/Bed#: [facility] I Date of Admission: 11/15/2024  Progress Note  Alonzo Ball y.o. male MRN: [id number]  Unit/Bed#: [facility] Encounter: [id number]  Gallstone Pancreatitis  Cholelithiasis/cholecystitis  Hyperbilirubinemia/transaminitis - in setting of infection, MRCP negative for CBD stones     - Patient presented to the ED with complaints of epigastric abdominal pain and nausea x 2 days.  - CT Scan A/P shows cholelithiasis and inflammatory changes around the gallbladder suspicious for acute cholecystitis as well as inflammatory changes around the pancreatic head.  - BISAP score for pancreatitis 1.  - Yesterday: TB 4.39, , , . WBC count 12.46. Cr 1.27. Lipase >12,000.   - Today: TB 4.99, , , .  - Patient is afebrile and VSS.  - MRCP was negative for evidence of choledocholithiasis and no biliary ductal dilation, cholelithiasis, nonvisualization of the cystic duct.  - Continue Ceftriaxone and Flagyl IV.   - Fluid resuscitation with LR at 200cc/hr x 24 hours - will decrease to 150cc/hr, serial abdominal exams, pain control.  - Monitor patient and labs, trend LFTs to ensure trend down, vital signs. Follow up pending blood cultures (1 + coagulase neg staph- suspect contaminant).  - General surgery consulted regarding lap patel.  - Incentive spirometry and DVT prophylaxis.      Subjective:  Patient reports improvement in his abdominal pain today. No fevers. No vomiting.    Objective:     Vitals:   Vitals:    11/17/24 0930   BP:    Pulse:    Resp:    Temp:    SpO2: 94%   ,Body mass index is 34.53 kg/m².      Intake/Output Summary (Last 24 hours) at 11/17/2024 0950  Last data filed at 11/17/2024 0929  Gross per 24 hour   Intake 2106.67 ml   Output 900 ml   Net 1206.67 ml       Physical Exam:     General Appearance: Alert, appears stated age and cooperative  Lungs: Clear to  auscultation bilaterally, no rales or rhonchi, no labored breathing/accessory muscle use  Heart: Regular rate and rhythm, S1, S2 normal, no murmur, click, rub or gallop  Abdomen: Soft, non-tender, non-distended; bowel sounds normal; no masses or no organomegaly  Extremities: No cyanosis, edema    Invasive Devices       Peripheral Intravenous Line  Duration             Peripheral IV 11/17/24 Distal;Right;Upper;Ventral (anterior) Arm <1 day                    Lab Results:  Admission on 11/15/2024   Component Date Value    Sodium 11/15/2024 141     Potassium 11/15/2024 4.0     Chloride 11/15/2024 104     CO2 11/15/2024 30     ANION GAP 11/15/2024 7     BUN 11/15/2024 16     Creatinine 11/15/2024 1.21     Glucose 11/15/2024 150 (H)     Calcium 11/15/2024 10.6 (H)     AST 11/15/2024 665 (H)     ALT 11/15/2024 613 (H)     Alkaline Phosphatase 11/15/2024 142 (H)     Total Protein 11/15/2024 7.3     Albumin 11/15/2024 4.2     Total Bilirubin 11/15/2024 3.30 (H)     eGFR 11/15/2024 58     WBC 11/15/2024 13.05 (H)     RBC 11/15/2024 5.81 (H)     Hemoglobin 11/15/2024 16.2     Hematocrit 11/15/2024 52.0 (H)     MCV 11/15/2024 90     MCH 11/15/2024 27.9     MCHC 11/15/2024 31.2 (L)     RDW 11/15/2024 13.2     MPV 11/15/2024 8.9     Platelets 11/15/2024 346     nRBC 11/15/2024 0     Segmented % 11/15/2024 87 (H)     Immature Grans % 11/15/2024 1     Lymphocytes % 11/15/2024 7 (L)     Monocytes % 11/15/2024 4     Eosinophils Relative 11/15/2024 1     Basophils Relative 11/15/2024 0     Absolute Neutrophils 11/15/2024 11.42 (H)     Absolute Immature Grans 11/15/2024 0.07     Absolute Lymphocytes 11/15/2024 0.95     Absolute Monocytes 11/15/2024 0.54     Eosinophils Absolute 11/15/2024 0.06     Basophils Absolute 11/15/2024 0.01     Lipase 11/15/2024 >12,000 (H)     hs TnI 0hr 11/15/2024 5     Ventricular Rate 11/15/2024 91     Atrial Rate 11/15/2024 91     MS Interval 11/15/2024 146     QRSD Interval 11/15/2024 86     QT Interval  11/15/2024 352     QTC Interval 11/15/2024 432     P Axis 11/15/2024 54     QRS Axis 11/15/2024 2     T Wave [place] 11/15/2024 -36     hs TnI 2hr 11/15/2024 9     Delta 2hr hsTnI 11/15/2024 4     hs TnI 4hr 11/15/2024 11     Delta 4hr hsTnI 11/15/2024 6     Gram Stain Result 11/15/2024 Gram positive cocci in clusters (A)     Blood Culture 11/15/2024 No Growth at 24 hrs.     Sodium 11/16/2024 142     Potassium 11/16/2024 4.2     Chloride 11/16/2024 108     CO2 11/16/2024 27     ANION GAP 11/16/2024 7     BUN 11/16/2024 17     Creatinine 11/16/2024 1.27     Glucose 11/16/2024 112     Calcium 11/16/2024 9.4     AST 11/16/2024 556 (H)     ALT 11/16/2024 692 (H)     Alkaline Phosphatase 11/16/2024 138 (H)     Total Protein 11/16/2024 6.5     Albumin 11/16/2024 3.8     Total Bilirubin 11/16/2024 4.39 (H)     eGFR 11/16/2024 54     WBC 11/16/2024 12.46 (H)     RBC 11/16/2024 5.32     Hemoglobin 11/16/2024 15.0     Hematocrit 11/16/2024 48.0     MCV 11/16/2024 90     MCH 11/16/2024 28.2     MCHC 11/16/2024 31.3 (L)     RDW 11/16/2024 13.5     MPV 11/16/2024 8.9     Platelets 11/16/2024 283     nRBC 11/16/2024 0     Segmented % 11/16/2024 90 (H)     Immature Grans % 11/16/2024 0     Lymphocytes % 11/16/2024 5 (L)     Monocytes % 11/16/2024 5     Eosinophils Relative 11/16/2024 0     Basophils Relative 11/16/2024 0     Absolute Neutrophils 11/16/2024 11.05 (H)     Absolute Immature Grans 11/16/2024 0.03     Absolute Lymphocytes 11/16/2024 0.65     Absolute Monocytes 11/16/2024 0.66     Eosinophils Absolute 11/16/2024 0.04     Basophils Absolute 11/16/2024 0.03     Magnesium 11/16/2024 1.9     Phosphorus 11/16/2024 3.3     Lipase 11/16/2024 2,158 (H)     Staphylococcus 11/15/2024 Detected (A)     Sodium 11/17/2024 141     Potassium 11/17/2024 3.8     Chloride 11/17/2024 107     CO2 11/17/2024 24     ANION GAP 11/17/2024 10     BUN 11/17/2024 13     Creatinine 11/17/2024 1.07     Glucose 11/17/2024 91     Calcium 11/17/2024 9.1      AST 11/17/2024 247 (H)     ALT 11/17/2024 463 (H)     Alkaline Phosphatase 11/17/2024 142 (H)     Total Protein 11/17/2024 6.2 (L)     Albumin 11/17/2024 3.6     Total Bilirubin 11/17/2024 4.99 (H)     eGFR 11/17/2024 67     WBC 11/17/2024 14.78 (H)     RBC 11/17/2024 5.03     Hemoglobin 11/17/2024 14.2     Hematocrit 11/17/2024 45.6     MCV 11/17/2024 91     MCH 11/17/2024 28.2     MCHC 11/17/2024 31.1 (L)     RDW 11/17/2024 13.9     MPV 11/17/2024 9.3     Platelets 11/17/2024 264     nRBC 11/17/2024 0     Segmented % 11/17/2024 87 (H)     Immature Grans % 11/17/2024 1     Lymphocytes % 11/17/2024 6 (L)     Monocytes % 11/17/2024 6     Eosinophils Relative 11/17/2024 0     Basophils Relative 11/17/2024 0     Absolute Neutrophils 11/17/2024 12.88 (H)     Absolute Immature Grans 11/17/2024 0.10     Absolute Lymphocytes 11/17/2024 0.84     Absolute Monocytes 11/17/2024 0.88     Eosinophils Absolute 11/17/2024 0.04     Basophils Absolute 11/17/2024 0.04        Imaging Studies:   I have personally reviewed pertinent imaging studies.    MRI abdomen wo contrast and mrcp  Result Date: 11/17/2024  Narrative: MRI OF THE ABDOMEN WITHOUT CONTRAST WITH MRCP INDICATION: 73 years / Male. stones. COMPARISON: CT performed November 15, 2024. TECHNIQUE: Multiplanar/multisequence MRI of the abdomen with 3D MRCP was performed without the administration of contrast. FINDINGS: Some image quality degradation related to unavoidable respiratory motion artifact. LOWER CHEST: Bibasilar atelectasis. LIVER: Normal in size and configuration. No noncontrast evidence for suspicious hepatic mass. Limited evaluation of hepatic and portal veins on this non-contrast MRI is unremarkable. BILE DUCTS: No intrahepatic or extrahepatic bile duct dilation. Common bile duct is normal in caliber. No choledocholithiasis, biliary stricture or suspicious mass. Of note, cystic duct which was intensely enhancing on contrast-enhanced CT examination performed 1  "day earlier is not visible by MRCP. GALLBLADDER: Incompletely distended with lobulated and intraluminal gallstones but no wall thickening, surrounding pericholecystic edema or pericholecystic fluid. PANCREAS: Inflammatory changes and edema in a pattern consistent with interstitial edematous pancreatitis. No pancreatic ductal enlargement. No noncontrast MR evidence for solid or cystic pancreatic mass. ADRENAL GLANDS: Unremarkable. SPLEEN: Normal. KIDNEYS/PROXIMAL URETERS: No hydroureteronephrosis. No suspicious renal mass. BOWEL: Metal artifact associated with structure in the dependent lumen of the gastric fundus. Sliding-type hiatal hernia. Reactive edema surrounding the duodenum. PERITONEUM/RETROPERITONEUM: Peripancreatic inflammatory edema with some edematous changes settling along the ventral borders of pararenal fascia in a pattern typical of interstitial edematous pancreatitis. No jocelyn pancreatic or peripancreatic fluid collection. LYMPH NODES: No abdominal lymphadenopathy. VESSELS: No aneurysm. ABDOMINAL WALL: Unremarkable BONES: No suspicious osseous lesion.     Impression: No MRCP evidence for choledocholithiasis with no evidence for pancreatic or biliary ductal dilatation. Cholelithiasis. Gallbladder demonstrates a lobulated incompletely distended appearance that can be seen in the setting of gallbladder adenomyomatosis. Acute interstitial edematous pancreatitis. Nonvisualization of the cystic duct which was intensely enhancing at the time of yesterday's CT examination suggesting some element of cholangitis. Artifact associated with punctate metallic density in the dependent lumen of the gallbladder fundus corresponding to similar findings seen in that location in retrospect on recent CT. Correlate for recent ingestion of tiny metallic substance. This examination was marked \"immediate notification\" in Epic in order to begin the standard process by which the radiology reading room liaison alerts the " referring practitioner. Workstation performed: [id number]     CT abdomen pelvis with contrast  Result Date: 11/15/2024  Narrative: CT ABDOMEN AND PELVIS WITH IV CONTRAST INDICATION: Abdominal pain, vomiting. . COMPARISON: None. TECHNIQUE: CT examination of the abdomen and pelvis was performed. Multiplanar 2D reformatted images were created from the source data. This examination, like all CT scans performed in the [facility], was performed utilizing techniques to minimize radiation dose exposure, including the use of iterative reconstruction and automated exposure control. Radiation dose length product (DLP) for this visit: 1947 mGy-cm IV Contrast: 100 mL of iohexol (OMNIPAQUE) Enteric Contrast: Not administered. FINDINGS: ABDOMEN INCLUDED CHEST: Bibasilar atelectasis. Multivessel coronary artery disease. LIVER/BILIARY TREE: Hepatic steatosis. No biliary dilatation. GALLBLADDER: Cholelithiasis. Inflammatory stranding around the gallbladder and extrahepatic CBD. SPLEEN: Unremarkable. PANCREAS: Inflammatory changes around the pancreatic head. No peripancreatic fluid collection. No pancreatic ductal dilatation. ADRENAL GLANDS: Unremarkable. KIDNEYS/URETERS: Unremarkable. No hydronephrosis. STOMACH AND BOWEL: Small hiatal hernia. Diverticulosis. No bowel obstruction or inflammation. APPENDIX: No findings to suggest appendicitis. ABDOMINOPELVIC CAVITY: No ascites. No pneumoperitoneum. No lymphadenopathy. VESSELS: Unremarkable for patient's age. PELVIS REPRODUCTIVE ORGANS: Enlarged prostate. URINARY BLADDER: Unremarkable. ABDOMINAL WALL/INGUINAL REGIONS: Unremarkable. BONES: No acute fracture or suspicious osseous lesion. Spinal degenerative changes.     Impression: Cholelithiasis. Inflammatory changes around the gallbladder suspicious for acute cholecystitis. Recommend correlation with clinical exam and right upper quadrant ultrasound. Inflammatory changes around the pancreatic head which may be reactive,  although a concomitant mild acute pancreatitis is not excluded. Hepatic steatosis. Findings communicated to XAVIER LEHMAN on 11/15/2024 3:43 PM. Workstation performed: [id number]     XR chest 2 views  Result Date: 11/15/2024  Narrative: XR CHEST PA AND LATERAL INDICATION: upper ap. COMPARISON: None FINDINGS: Poor inspiratory effort. Clear lungs. No pneumothorax or pleural effusion. Normal cardiomediastinal silhouette. Bones are unremarkable for age. Normal upper abdomen.     Impression: No acute cardiopulmonary disease. Workstation performed: [id number]         Dora Jordan PA-C  11/17/2024,9:50 AM

## 2024-11-17 NOTE — PROGRESS NOTES
Progress Note - Hospitalist   Name: Alonzo Babin 73 y.o. male I MRN: [id number]  Unit/Bed#: [facility] I Date of Admission: 11/15/2024   Date of Service: 11/17/2024 I Hospital Day: 2     Assessment & Plan  Gallstone pancreatitis  Patient presenting with abdominal pain initially went to urgent care was sent here for further evaluation  CTAP showed cholelithiasis. Inflammatory changes around the gallbladder suspicious for acute cholecystitis. Possible pancreatitis  Lipase noted to be elevated greater than 12,000.  Elevated AST 65, , alk phos 142. No ethanol use  Lipase down trended to 2,152  Blood cultures x 2.  1 set with preliminary result growth of gram-positive cocci in clusters.  Second set no growth  IVF reduced to 150/hr  Pain control and antiemetics.   N.p.o.  Continue ceftriaxone and Flagyl  GI consulted  Holding off on ERCP   Continue to trend LFTs  Surgery consulted  Remain n.p.o. today  Can likely restart slow diet tomorrow  Patient will be having surgery in [place] after discharge  MRI/MRCP  No evidence of choledocholithiasis, cholelithiasis with gallbladder demonstrating lobulated incompletely distended appearance likely in the setting of gallbladder adenomyomatosis. Rest of findings in imaging report    Cholecystitis  Presenting with abdominal pain  CTAP with cholelithiasis and concern for acute cholecystitis  Continue ceftriaxone and Flagyl  Surgery consulted with plan above  HTN (hypertension)  Takes enalapril 5 mg PTA  Lisinopril substituted for enalapril    VTE Pharmacologic Prophylaxis: VTE Score: 4 Moderate Risk (Score 3-4) - Pharmacological DVT Prophylaxis Ordered: enoxaparin (Lovenox).    Mobility:   Basic Mobility Inpatient Raw Score: 24  JH-HLM Goal: 8: Walk 250 feet or more  JH-HLM Achieved: 6: Walk 10 steps or more  JH-HLM Goal NOT achieved. Continue with multidisciplinary rounding and encourage appropriate mobility to improve upon JH-HLM goals.    Patient Centered Rounds: I  performed bedside rounds with nursing staff today.   Discussions with Specialists or Other Care Team Provider: Surgery    Education and Discussions with Family / Patient: Updated  (wife) at bedside.    Current Length of Stay: 2 day(s)  Current Patient Status: Inpatient   Certification Statement: The patient will continue to require additional inpatient hospital stay due to ERCP  Discharge Plan: Anticipate discharge in 48-72 hrs to home.    Code Status: Level 1 - Full Code    Subjective   Patient seen and examined.  Patient states that his pain is better than yesterday.  His pain is mainly towards his left back area and states that his abdomen does not really hurt anymore.  Nontender on exam.  Discussed plan with patient.    Objective :  Temp:  [98.8 °F (37.1 °C)-99.1 °F (37.3 °C)] 99.1 °F (37.3 °C)  HR:  [83-95] 83  BP: (143-150)/(87-97) 145/97  Resp:  [16-18] 16  SpO2:  [90 %-97 %] 94 %  O2 Device: None (Room air)  Nasal Cannula O2 Flow Rate (L/min):  [2 L/min] 2 L/min    Body mass index is 34.53 kg/m².     Input and Output Summary (last 24 hours):     Intake/Output Summary (Last 24 hours) at 11/17/2024 1012  Last data filed at 11/17/2024 0929  Gross per 24 hour   Intake 2106.67 ml   Output 900 ml   Net 1206.67 ml       Physical Exam  Vitals reviewed.   Constitutional:       Appearance: Normal appearance.   HENT:      Head: Normocephalic and atraumatic.      Mouth/Throat:      Mouth: Mucous membranes are moist.      Pharynx: Oropharynx is clear.   Cardiovascular:      Rate and Rhythm: Normal rate and regular rhythm.   Pulmonary:      Effort: Pulmonary effort is normal.      Breath sounds: Normal breath sounds.   Abdominal:      General: Abdomen is flat. Bowel sounds are normal. There is no distension.      Tenderness: There is no abdominal tenderness.   Musculoskeletal:      Right lower leg: No edema.      Left lower leg: No edema.   Skin:     General: Skin is warm and dry.   Neurological:      Mental  Status: He is alert and oriented to person, place, and time.   Psychiatric:         Mood and Affect: Mood normal.         Behavior: Behavior normal.           Lines/Drains:      Lab Results: I have reviewed the following results:   Results from last 7 days   Lab Units 11/17/24  0516   WBC Thousand/uL 14.78*   HEMOGLOBIN g/dL 14.2   HEMATOCRIT % 45.6   PLATELETS Thousands/uL 264   SEGS PCT % 87*   LYMPHO PCT % 6*   MONO PCT % 6   EOS PCT % 0     Results from last 7 days   Lab Units 11/17/24  0516   SODIUM mmol/L 141   POTASSIUM mmol/L 3.8   CHLORIDE mmol/L 107   CO2 mmol/L 24   BUN mg/dL 13   CREATININE mg/dL 1.07   ANION GAP mmol/L 10   CALCIUM mg/dL 9.1   ALBUMIN g/dL 3.6   TOTAL BILIRUBIN mg/dL 4.99*   ALK PHOS U/L 142*   ALT U/L 463*   AST U/L 247*   GLUCOSE RANDOM mg/dL 91                       Recent Cultures (last 7 days):   Results from last 7 days   Lab Units 11/15/24  1623 11/15/24  1615   BLOOD CULTURE  No Growth at 24 hrs.  --    GRAM STAIN RESULT   --  Gram positive cocci in clusters*       Imaging Results Review: No pertinent imaging studies reviewed.  Other Study Results Review: No additional pertinent studies reviewed.    Last 24 Hours Medication List:     Current Facility-Administered Medications:     cefTRIAXone (ROCEPHIN) 1,000 mg in dextrose 5 % 50 mL IVPB, Q24H, Last Rate: 1,000 mg (11/16/24 1620)    enoxaparin (LOVENOX) subcutaneous injection 40 mg, Daily    HYDROmorphone (DILAUDID) injection 0.5 mg, Q2H PRN    HYDROmorphone (DILAUDID) injection 1 mg, Q4H PRN    lactated ringers infusion, Continuous, Last Rate: 150 mL/hr (11/17/24 0921)    lisinopril (ZESTRIL) tablet 10 mg, Daily    metroNIDAZOLE (FLAGYL) IVPB (premix) 500 mg 100 mL, Q8H, Last Rate: 500 mg (11/17/24 0738)    Administrative Statements   Today, Patient Was Seen By: Krishna Guzman MD  I have spent a total time of 35 minutes in caring for this patient on the day of the visit/encounter including Patient and family education,  Documenting in the medical record, and Obtaining or reviewing history  .    **Please Note: This note may have been constructed using a voice recognition system.**

## 2024-11-17 NOTE — ASSESSMENT & PLAN NOTE
Presenting with abdominal pain  CTAP with cholelithiasis and concern for acute cholecystitis  Continue ceftriaxone and Flagyl  Surgery consulted with plan above

## 2024-11-17 NOTE — ASSESSMENT & PLAN NOTE
Patient presenting with abdominal pain initially went to urgent care was sent here for further evaluation  CTAP showed cholelithiasis. Inflammatory changes around the gallbladder suspicious for acute cholecystitis. Possible pancreatitis  Lipase noted to be elevated greater than 12,000.  Elevated AST 65, , alk phos 142. No ethanol use  Lipase down trended to 2,152  Blood cultures x 2.  1 set with preliminary result growth of gram-positive cocci in clusters.  Second set no growth  IVF reduced to 150/hr  Pain control and antiemetics.   N.p.o.  Continue ceftriaxone and Flagyl  GI consulted  Holding off on ERCP   Continue to trend LFTs  Surgery consulted  Remain n.p.o. 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 Rest of findings in imaging report

## 2024-11-17 NOTE — PLAN OF CARE
Problem: PAIN - ADULT  Goal: Verbalizes/displays adequate comfort level or baseline comfort level  Description: Interventions:  - Encourage patient to monitor pain and request assistance  - Assess pain using appropriate pain scale  - Administer analgesics based on type and severity of pain and evaluate response  - Implement non-pharmacological measures as appropriate and evaluate response  - Consider cultural and social influences on pain and pain management  - Notify physician/advanced practitioner if interventions unsuccessful or patient reports new pain  Outcome: Progressing     Problem: INFECTION - ADULT  Goal: Absence or prevention of progression during hospitalization  Description: INTERVENTIONS:  - Assess and monitor for signs and symptoms of infection  - Monitor lab/diagnostic results  - Monitor all insertion sites, i.e. indwelling lines, tubes, and drains  - Monitor endotracheal if appropriate and nasal secretions for changes in amount and color  - [place] appropriate cooling/warming therapies per order  - Administer medications as ordered  - Instruct and encourage patient and family to use good hand hygiene technique  - Identify and instruct in appropriate isolation precautions for identified infection/condition  Outcome: Progressing  Goal: Absence of fever/infection during neutropenic period  Description: INTERVENTIONS:  - Monitor WBC    Outcome: Progressing     Problem: SAFETY ADULT  Goal: Patient will remain free of falls  Description: INTERVENTIONS:  - Educate patient/family on patient safety including physical limitations  - Instruct patient to call for assistance with activity   - Consult OT/PT to assist with strengthening/mobility   - Keep Call bell within reach  - Keep bed low and locked with side rails adjusted as appropriate  - Keep care items and personal belongings within reach  - Initiate and maintain comfort rounds  - Make Fall Risk Sign visible to staff  - Offer Toileting every  Hours,  in advance of need  - Initiate/Maintain alarm  - Obtain necessary fall risk management equipment:   - Apply yellow socks and bracelet for high fall risk patients  - Consider moving patient to room near nurses station  Outcome: Progressing  Goal: Maintain or return to baseline ADL function  Description: INTERVENTIONS:  -  Assess patient's ability to carry out ADLs; assess patient's baseline for ADL function and identify physical deficits which impact ability to perform ADLs (bathing, care of mouth/teeth, toileting, grooming, dressing, etc.)  - Assess/evaluate cause of self-care deficits   - Assess range of motion  - Assess patient's mobility; develop plan if impaired  - Assess patient's need for assistive devices and provide as appropriate  - Encourage maximum independence but intervene and supervise when necessary  - Involve family in performance of ADLs  - Assess for home care needs following discharge   - Consider OT consult to assist with ADL evaluation and planning for discharge  - Provide patient education as appropriate  Outcome: Progressing  Goal: Maintains/Returns to pre admission functional level  Description: INTERVENTIONS:  - Perform AM-PAC 6 Click Basic Mobility/ Daily Activity assessment daily.  - Set and communicate daily mobility goal to care team and patient/family/caregiver.   - Collaborate with rehabilitation services on mobility goals if consulted  - Perform Range of Motion times a day.  - Reposition patient every  hours.  - Dangle patient  times a day  - Stand patient  times a day  - Ambulate patient  times a day  - Out of bed to chair  times a day   - Out of bed for meals times a day  - Out of bed for toileting  - Record patient progress and toleration of activity level   Outcome: Progressing     Problem: DISCHARGE PLANNING  Goal: Discharge to home or other facility with appropriate resources  Description: INTERVENTIONS:  - Identify barriers to discharge w/patient and caregiver  - Arrange for  needed discharge resources and transportation as appropriate  - Identify discharge learning needs (meds, wound care, etc.)  - Arrange for interpretive services to assist at discharge as needed  - Refer to Case Management Department for coordinating discharge planning if the patient needs post-hospital services based on physician/advanced practitioner order or complex needs related to functional status, cognitive ability, or social support system  Outcome: Progressing     Problem: Knowledge Deficit  Goal: Patient/family/caregiver demonstrates understanding of disease process, treatment plan, medications, and discharge instructions  Description: Complete learning assessment and assess knowledge base.  Interventions:  - Provide teaching at level of understanding  - Provide teaching via preferred learning methods  Outcome: Progressing

## 2024-11-18 LAB
ALBUMIN SERPL BCG-MCNC: 3.3 G/DL (ref 3.5–5)
ALP SERPL-CCNC: 141 U/L (ref 34–104)
ALT SERPL W P-5'-P-CCNC: 278 U/L (ref 7–52)
ANION GAP SERPL CALCULATED.3IONS-SCNC: 6 MMOL/L (ref 4–13)
AST SERPL W P-5'-P-CCNC: 99 U/L (ref 13–39)
BILIRUB SERPL-MCNC: 4.04 MG/DL (ref 0.2–1)
BUN SERPL-MCNC: 9 MG/DL (ref 5–25)
CALCIUM ALBUM COR SERPL-MCNC: 9.4 MG/DL (ref 8.3–10.1)
CALCIUM SERPL-MCNC: 8.8 MG/DL (ref 8.4–10.2)
CHLORIDE SERPL-SCNC: 107 MMOL/L (ref 96–108)
CO2 SERPL-SCNC: 22 MMOL/L (ref 21–32)
CREAT SERPL-MCNC: 0.78 MG/DL (ref 0.6–1.3)
ERYTHROCYTE [DISTWIDTH] IN BLOOD BY AUTOMATED COUNT: 13.7 % (ref 11.6–15.1)
GFR SERPL CREATININE-BSD FRML MDRD: 88 ML/MIN/1.73SQ M
GLUCOSE SERPL-MCNC: 124 MG/DL (ref 65–140)
HCT VFR BLD AUTO: 43.6 % (ref 36.5–49.3)
HGB BLD-MCNC: 14.1 G/DL (ref 12–17)
MCH RBC QN AUTO: 28.6 PG (ref 26.8–34.3)
MCHC RBC AUTO-ENTMCNC: 32.3 G/DL (ref 31.4–37.4)
MCV RBC AUTO: 88 FL (ref 82–98)
PLATELET # BLD AUTO: 232 THOUSANDS/UL (ref 149–390)
PMV BLD AUTO: 9.1 FL (ref 8.9–12.7)
POTASSIUM SERPL-SCNC: 3.8 MMOL/L (ref 3.5–5.3)
PROT SERPL-MCNC: 6 G/DL (ref 6.4–8.4)
RBC # BLD AUTO: 4.93 MILLION/UL (ref 3.88–5.62)
SODIUM SERPL-SCNC: 135 MMOL/L (ref 135–147)
WBC # BLD AUTO: 12.98 THOUSAND/UL (ref 4.31–10.16)

## 2024-11-18 PROCEDURE — 99232 SBSQ HOSP IP/OBS MODERATE 35: CPT | Performed by: INTERNAL MEDICINE

## 2024-11-18 PROCEDURE — 85027 COMPLETE CBC AUTOMATED: CPT

## 2024-11-18 PROCEDURE — 99232 SBSQ HOSP IP/OBS MODERATE 35: CPT | Performed by: SURGERY

## 2024-11-18 PROCEDURE — 99232 SBSQ HOSP IP/OBS MODERATE 35: CPT | Performed by: HOSPITALIST

## 2024-11-18 PROCEDURE — 80053 COMPREHEN METABOLIC PANEL: CPT | Performed by: PHYSICIAN ASSISTANT

## 2024-11-18 RX ORDER — IBUPROFEN 400 MG/1
400 TABLET, FILM COATED ORAL ONCE
Status: COMPLETED | OUTPATIENT
Start: 2024-11-18 | End: 2024-11-18

## 2024-11-18 RX ADMIN — METRONIDAZOLE 500 MG: 500 INJECTION, SOLUTION INTRAVENOUS at 22:50

## 2024-11-18 RX ADMIN — ENOXAPARIN SODIUM 40 MG: 40 INJECTION SUBCUTANEOUS at 09:21

## 2024-11-18 RX ADMIN — METRONIDAZOLE 500 MG: 500 INJECTION, SOLUTION INTRAVENOUS at 00:09

## 2024-11-18 RX ADMIN — CEFTRIAXONE SODIUM 1000 MG: 10 INJECTION, POWDER, FOR SOLUTION INTRAVENOUS at 16:19

## 2024-11-18 RX ADMIN — IBUPROFEN 400 MG: 400 TABLET, FILM COATED ORAL at 17:58

## 2024-11-18 RX ADMIN — LIDOCAINE 1 PATCH: 50 PATCH CUTANEOUS at 09:22

## 2024-11-18 RX ADMIN — DEXTROSE, SODIUM CHLORIDE, AND POTASSIUM CHLORIDE 175 ML/HR: 5; .45; .15 INJECTION INTRAVENOUS at 17:03

## 2024-11-18 RX ADMIN — METRONIDAZOLE 500 MG: 500 INJECTION, SOLUTION INTRAVENOUS at 15:40

## 2024-11-18 RX ADMIN — LISINOPRIL 10 MG: 10 TABLET ORAL at 09:21

## 2024-11-18 RX ADMIN — DEXTROSE, SODIUM CHLORIDE, AND POTASSIUM CHLORIDE 175 ML/HR: 5; .45; .15 INJECTION INTRAVENOUS at 10:49

## 2024-11-18 RX ADMIN — METRONIDAZOLE 500 MG: 500 INJECTION, SOLUTION INTRAVENOUS at 08:23

## 2024-11-18 RX ADMIN — DEXTROSE, SODIUM CHLORIDE, AND POTASSIUM CHLORIDE 175 ML/HR: 5; .45; .15 INJECTION INTRAVENOUS at 02:34

## 2024-11-18 NOTE — ASSESSMENT & PLAN NOTE
Patient presenting with abdominal pain initially went to urgent care was sent here for further evaluation  CTAP showed cholelithiasis. Inflammatory changes around the gallbladder suspicious for acute cholecystitis. Possible pancreatitis  Lipase noted to be elevated greater than 12,000.  Elevated AST 65, , alk phos 142. No ethanol use  Lipase down trended to 2,152  Blood cultures x 2.  1 set with CNS, likely contaminant.  Second set no growth  Cont IVFs  Pain control and antiemetics.   diet advanced to clears as per surgery as no plans for laparoscopic cholecystectomy at this time.  Will follow-up where he is from in [place].  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 Rest of findings in imaging report

## 2024-11-18 NOTE — ASSESSMENT & PLAN NOTE
Transaminitis    AVSS, WBC 13 from 14.8, T bilirubin 4 from 5, liver enzymes downtrending    Plan:  Okay for clear liquid diet at this time since abdominal pain is significantly improved  We do not anticipate surgical intervention this admission as patient is from [place] and would like to follow-up with his surgeon there.  Given his severity of pancreatitis I believe an interval laparoscopic cholecystectomy is appropriate within a 4 to 6 week timeframe.  Analgesia and antiemetics, as needed  Monitor for evidence of cholangitis  Monitor labs and vitals and trend LFTs for improvement  Serial abdominal exams  DVT prophylaxis  Encourage ambulation and incentive spirometry  Remainder of care per primary team  General Surgery will continue to follow

## 2024-11-18 NOTE — CASE MANAGEMENT
Case Management Assessment & Discharge Planning Note    Patient name Alonzo Babin  Location /-01 MRN [id number]  : 1950 Date 2024       Current Admission Date: 11/15/2024  Current Admission Diagnosis:Gallstone pancreatitis   Patient Active Problem List    Diagnosis Date Noted Date Diagnosed    Gallstone pancreatitis 11/15/2024     Cholecystitis 11/15/2024     HTN (hypertension) 11/15/2024       LOS (days): 3  Geometric Mean LOS (GMLOS) (days):   Days to GMLOS:     OBJECTIVE:    Risk of Unplanned Readmission Score: 8.92      Current admission status: Inpatient       Preferred Pharmacy:   [id number] - [facility] [place] [place] - [address]  [address] [place] [place] [address] [address]  Phone: [phone] Fax: [phone]    Primary Care Provider: No primary care provider on file.    Primary Insurance: MEDICARE  Secondary Insurance: BLUE CROSS    ASSESSMENT:  Active Health Care Proxies       Gema Babin Health Care Agent - Spouse   Primary Phone: [phone] (Mobile)                 Advance Directives  Does patient have a Health Care POA?: Yes  Does patient have Advance Directives?: Yes  Advance Directives: Living will, Power of  for health care, Power of  for finance  Primary Contact: Patient's Wife (Gema)    Readmission Root Cause  30 Day Readmission: No    Patient Information  Admitted from:: Home  Mental Status: Alert  During Assessment patient was accompanied by: Not accompanied during assessment  Assessment information provided by:: Patient  Primary Caregiver: Self  Support Systems: Self, Spouse/significant other, Family members  County of Residence: Other (specify in comment box) ([place])  What city do you live in?: [place], [place]  Home entry access options. Select all that apply.: Stairs  Number of steps to enter home.: 2  Do the steps have railings?: Yes  Type of Current Residence: [facility]  Living Arrangements: Lives w/ Spouse/significant other  Is  patient a ?: Yes  [facility] with [facility]: Yes  Is patient service connected?: No    Activities of Daily Living Prior to Admission  Functional Status: Independent  Completes ADLs independently?: Yes  Ambulates independently?: Yes  Does patient use assisted devices?: No  Does patient currently own DME?: No  Does patient have a history of Outpatient Therapy (PT/OT)?: No  Does the patient have a history of Short-Term Rehab?: No  Does patient have a history of HHC?: No  Does patient currently have HHC?: No    Patient Information Continued  Income Source: Pension/skilled nursing  Does patient have prescription coverage?: Yes (Patient confirmed that he uses [facility] in [place], and he denied any barriers to obtaining or affording prescriptions.)  Does patient receive dialysis treatments?: No  Does patient have a history of substance abuse?: No  Does patient have a history of Mental Health Diagnosis?: No    Means of Transportation  Means of Transport to Appts:: Drives Self    DISCHARGE DETAILS:    Discharge planning discussed with:: Patient  Freedom of Choice: Yes  Comments - Freedom of Choice: CM discussed FOC as it pertains to discharge planning. Patient denied any needs at this time and prefers to return home with his wife, who will provide transportation at discharge.  CM contacted family/caregiver?: No- see comments (Patient declined.)  Were Treatment Team discharge recommendations reviewed with patient/caregiver?: Yes  Did patient/caregiver verbalize understanding of patient care needs?: Yes  Were patient/caregiver advised of the risks associated with not following Treatment Team discharge recommendations?: Yes    Requested Home Health Care         Is the patient interested in HHC at discharge?: No    DME Referral Provided  Referral made for DME?: No    Other Referral/Resources/Interventions Provided:  Interventions: None Indicated    Would you like to participate in our [facility]  service program?  : No - Declined    Treatment Team Recommendation: Home  Discharge Destination Plan:: Home  Transport at Discharge : Family    IMM Given (Date):: 11/18/24  IMM Given to:: Patient  Additional Comments: CM reviewed IMM with patient at bedside. He reported understanding of these rights and denied any questions or concerns at this time. Copy given. Original copy placed in medical records.

## 2024-11-18 NOTE — PROGRESS NOTES
Progress Note - Gastroenterology   Name: Alonzo Babin 73 y.o. male I MRN: [id number]  Unit/Bed#: [facility] I Date of Admission: 11/15/2024   Date of Service: 11/18/2024 I Hospital Day: 3    Assessment & Plan  Gallstone pancreatitis    Cholecystitis  -Patient presented to the ED with complaints of epigastric abdominal pain and nausea x 2 days.  -CT Scan A/P shows cholelithiasis and inflammatory changes around the gallbladder suspicious for acute cholecystitis as well as inflammatory changes around the pancreatic head.  -BISAP score for pancreatitis 1.  -11/18/2024 AST 99, , alk phos 141, total bili 4.04  -MRCP was negative for evidence of choledocholithiasis and no biliary ductal dilation, cholelithiasis, nonvisualization of the cystic duct.  -Continue Ceftriaxone and Flagyl IV.   -Pain control, antiemetics  -IV fluids  -Continue to trend vitals  -Ambulate as tolerated  -General surgery consulted regarding lap patel.  Patient is going to follow-up with a general surgeon in [place] where he resides.  -Incentive spirometry and DVT prophylaxis.      I have discussed the above management plan in detail with the primary service.   Gastroenterology service signing off.    Subjective   Patient sitting on the side of the bed tolerating Jell-O at this time.  Overall patient reports significant improvement in symptoms.  Patient will follow-up with a general surgeon out on [place] where he resides.    Objective :  Temp:  [97.9 °F (36.6 °C)-100 °F (37.8 °C)] 97.9 °F (36.6 °C)  HR:  [80-85] 80  BP: (147-153)/(95-96) 149/95  Resp:  [13-18] 18  SpO2:  [93 %-95 %] 94 %    Physical Exam  Vitals and nursing note reviewed.   Constitutional:       General: He is not in acute distress.     Appearance: He is well-developed.   HENT:      Head: Normocephalic and atraumatic.   Eyes:      Conjunctiva/sclera: Conjunctivae normal.   Cardiovascular:      Rate and Rhythm: Normal rate and regular rhythm.      Heart sounds: No  murmur heard.  Pulmonary:      Effort: Pulmonary effort is normal. No respiratory distress.      Breath sounds: Normal breath sounds.   Abdominal:      Palpations: Abdomen is soft.      Tenderness: There is no abdominal tenderness.   Musculoskeletal:         General: No swelling.      Cervical back: Neck supple.   Skin:     General: Skin is warm and dry.      Capillary Refill: Capillary refill takes less than 2 seconds.   Neurological:      Mental Status: He is alert.   Psychiatric:         Mood and Affect: Mood normal.           Lab Results: I have reviewed the following results:LFTs:   .     11/18/24  0554   AST 99*   *   ALB 3.3*   TBILI 4.04*   ALKPHOS 141*        Imaging Results Review: No pertinent imaging studies reviewed.  Other Study Results Review: No additional pertinent studies reviewed.

## 2024-11-18 NOTE — ASSESSMENT & PLAN NOTE
-Patient presented to the ED with complaints of epigastric abdominal pain and nausea x 2 days.  -CT Scan A/P shows cholelithiasis and inflammatory changes around the gallbladder suspicious for acute cholecystitis as well as inflammatory changes around the pancreatic head.  -BISAP score for pancreatitis 1.  -11/18/2024 AST 99, , alk phos 141, total bili 4.04  -MRCP was negative for evidence of choledocholithiasis and no biliary ductal dilation, cholelithiasis, nonvisualization of the cystic duct.  -Continue Ceftriaxone and Flagyl IV.   -Pain control, antiemetics  -IV fluids  -Continue to trend vitals  -Ambulate as tolerated  -General surgery consulted regarding lap patel.  Patient is going to follow-up with a general surgeon in [place] where he resides.  -Incentive spirometry and DVT prophylaxis.

## 2024-11-18 NOTE — PROGRESS NOTES
Progress Note - Hospitalist   Name: Alonzo Babin 73 y.o. male I MRN: [id number]  Unit/Bed#: [facility] I Date of Admission: 11/15/2024   Date of Service: 11/18/2024 I Hospital Day: 3    Assessment & Plan  Gallstone pancreatitis  Patient presenting with abdominal pain initially went to urgent care was sent here for further evaluation  CTAP showed cholelithiasis. Inflammatory changes around the gallbladder suspicious for acute cholecystitis. Possible pancreatitis  Lipase noted to be elevated greater than 12,000.  Elevated AST 65, , alk phos 142. No ethanol use  Lipase down trended to 2,152  Blood cultures x 2.  1 set with CNS, likely contaminant.  Second set no growth  Cont IVFs  Pain control and antiemetics.   diet advanced to clears as per surgery as no plans for laparoscopic cholecystectomy at this time.  Will follow-up where he is from in [place].  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 Rest of findings in imaging report    Cholecystitis  Presenting with abdominal pain  CTAP with cholelithiasis and concern for acute cholecystitis  Continue ceftriaxone and Flagyl  Surgery following  11/17/24 RUQ U/S: Moderate fatty liver. Gallbladder contains stones and sludge. No evidence of acute cholecystitis. No evidence of biliary ductal dilatation or choledocholithiasis.   Acute cholecystitis likely ruled out based on this imaging study although the patient had already been on antibiotics prior to the right upper quadrant ultrasound.  HTN (hypertension)  -cont ACEi    VTE Pharmacologic Prophylaxis: VTE Score: 4 Moderate Risk (Score 3-4) - Pharmacological DVT Prophylaxis Ordered: enoxaparin (Lovenox).    Mobility:   Basic Mobility Inpatient Raw Score: 24  JH-HLM Goal: 8: Walk 250 feet or more  JH-HLM Achieved: 8: Walk 250 feet ot  more  -HLM Goal achieved. Continue to encourage appropriate mobility.    Patient Centered Rounds: I performed bedside rounds with nursing staff today.   Discussions with Specialists or Other Care Team Provider: GI    Education and Discussions with Family / Patient: Multiple family members at bedside    Current Length of Stay: 3 day(s)  Current Patient Status: Inpatient   Certification Statement: The patient will continue to require additional inpatient hospital stay due to gallsone pancreatitis  Discharge Plan: Anticipate discharge in 24-48 hrs to home.    Code Status: Level 1 - Full Code    Subjective   Currently without acute complaints.  Denies abdominal pain.    Objective :  Temp:  [97.9 °F (36.6 °C)-100 °F (37.8 °C)] 97.9 °F (36.6 °C)  HR:  [80-85] 80  BP: (147-153)/(95-96) 149/95  Resp:  [13-18] 18  SpO2:  [93 %-95 %] 94 %    Body mass index is 34.53 kg/m².     Input and Output Summary (last 24 hours):     Intake/Output Summary (Last 24 hours) at 11/18/2024 1325  Last data filed at 11/18/2024 1049  Gross per 24 hour   Intake 1000 ml   Output 1100 ml   Net -100 ml       Physical Exam  Gen: NAD, AAOx3, well developed, well nourished  Eyes: EOMI, PERRLA, no scleral icterus  ENMT:  no nasal discharge, no otic discharge, moist mucous membranes  Neck:  Supple  Cardiovascular:  Regular rate and rhythm, normal S1-S2, no murmurs, rubs, or gallops  Lungs:  Clear to auscultation bilaterally, no wheezes, or rales, or rhonchi  Abdomen:  Positive bowel sounds, soft, nontender, nondistended, no palpable organomegaly   Skin:  Intact, no obvious lesions or rashes, no edema  Neuro: Cranial nerves 2-12 are intact, non-focal, moves all 4 extremities      Lines/Drains:              Lab Results: I have reviewed the following results:   Results from last 7 days   Lab Units 11/18/24  0554 11/17/24  0516   WBC Thousand/uL 12.98* 14.78*   HEMOGLOBIN g/dL 14.1 14.2   HEMATOCRIT % 43.6 45.6   PLATELETS Thousands/uL 232 264   SEGS PCT %   --  87*   LYMPHO PCT %  --  6*   MONO PCT %  --  6   EOS PCT %  --  0     Results from last 7 days   Lab Units 11/18/24  0554   SODIUM mmol/L 135   POTASSIUM mmol/L 3.8   CHLORIDE mmol/L 107   CO2 mmol/L 22   BUN mg/dL 9   CREATININE mg/dL 0.78   ANION GAP mmol/L 6   CALCIUM mg/dL 8.8   ALBUMIN g/dL 3.3*   TOTAL BILIRUBIN mg/dL 4.04*   ALK PHOS U/L 141*   ALT U/L 278*   AST U/L 99*   GLUCOSE RANDOM mg/dL 124                       Recent Cultures (last 7 days):   Results from last 7 days   Lab Units 11/15/24  1623 11/15/24  1615   BLOOD CULTURE  No Growth at 48 hrs. Staphylococcus coagulase negative*   GRAM STAIN RESULT   --  Gram positive cocci in clusters*       Imaging Results Review: I reviewed radiology reports from this admission including: Ultrasound(s).    Last 24 Hours Medication List:     Current Facility-Administered Medications:     cefTRIAXone (ROCEPHIN) 1,000 mg in dextrose 5 % 50 mL IVPB, Q24H, Last Rate: 1,000 mg (11/17/24 1554)    dextrose 5 % and sodium chloride 0.45 % with KCl 20 mEq/L infusion, Continuous, Last Rate: 175 mL/hr (11/18/24 1049)    enoxaparin (LOVENOX) subcutaneous injection 40 mg, Daily    HYDROmorphone (DILAUDID) injection 0.5 mg, Q2H PRN    HYDROmorphone (DILAUDID) injection 1 mg, Q4H PRN    lidocaine (LIDODERM) 5 % patch 1 patch, Daily    lisinopril (ZESTRIL) tablet 10 mg, Daily    metroNIDAZOLE (FLAGYL) IVPB (premix) 500 mg 100 mL, Q8H, Last Rate: 500 mg (11/18/24 0823)    simethicone (MYLICON) chewable tablet 80 mg, Q6H PRN    Administrative Statements   Today, Patient Was Seen By: Brian Ring MD      **Please Note: This note may have been constructed using a voice recognition system.**

## 2024-11-18 NOTE — PROGRESS NOTES
"Progress Note - Surgery-General   Name: Alonzo Babin 73 y.o. male I MRN: [id number]  Unit/Bed#: -01 I Date of Admission: 11/15/2024   Date of Service: 11/18/2024 I Hospital Day: 3     Assessment & Plan  Gallstone pancreatitis  Transaminitis    AVSS, WBC 13 from 14.8, T bilirubin 4 from 5, liver enzymes downtrending    Plan:  Okay for clear liquid diet at this time since abdominal pain is significantly improved  We do not anticipate surgical intervention this admission as patient is from [place] and would like to follow-up with his surgeon there.  Given his severity of pancreatitis I believe an interval laparoscopic cholecystectomy is appropriate within a 4 to 6 week timeframe.  Analgesia and antiemetics, as needed  Monitor for evidence of cholangitis  Monitor labs and vitals and trend LFTs for improvement  Serial abdominal exams  DVT prophylaxis  Encourage ambulation and incentive spirometry  Remainder of care per primary team  General Surgery will continue to follow  HTN (hypertension)  Management per primary team  Cholecystitis      Subjective/Objective    Subjective: No acute events overnight.     Objective:     Blood pressure 149/95, pulse 80, temperature 97.9 °F (36.6 °C), resp. rate 18, height 5' 7\" (1.702 m), weight 100 kg (220 lb 7.4 oz), SpO2 94%.,Body mass index is 34.53 kg/m².      Intake/Output Summary (Last 24 hours) at 11/18/2024 0857  Last data filed at 11/18/2024 0700  Gross per 24 hour   Intake --   Output 1250 ml   Net -1250 ml       Invasive Devices       Peripheral Intravenous Line  Duration             Peripheral IV 11/17/24 Distal;Right;Upper;Ventral (anterior) Arm 1 day                    Physical Exam:   GEN: NAD  HEENT: NCAT, MMM  CV: RRR, no m/r/g  Lung: Normal effort, CTA B/L, no w/r/r  Ab: Soft, ND, minimally tender palpation the epigastric region.  Extrem: No CCE   Neuro: A+Ox3     Lab, Imaging and other studies:I have personally reviewed pertinent lab results.     VTE " Patient is scheduled- [facility]   Pharmacologic Prophylaxis: Heparin  VTE Mechanical Prophylaxis: sequential compression device    Recent Results (from the past 36 hours)   Comprehensive metabolic panel    Collection Time: 11/17/24  5:16 AM   Result Value Ref Range    Sodium 141 135 - 147 mmol/L    Potassium 3.8 3.5 - 5.3 mmol/L    Chloride 107 96 - 108 mmol/L    CO2 24 21 - 32 mmol/L    ANION GAP 10 4 - 13 mmol/L    BUN 13 5 - 25 mg/dL    Creatinine 1.07 0.60 - 1.30 mg/dL    Glucose 91 65 - 140 mg/dL    Calcium 9.1 8.4 - 10.2 mg/dL     (H) 13 - 39 U/L     (H) 7 - 52 U/L    Alkaline Phosphatase 142 (H) 34 - 104 U/L    Total Protein 6.2 (L) 6.4 - 8.4 g/dL    Albumin 3.6 3.5 - 5.0 g/dL    Total Bilirubin 4.99 (H) 0.20 - 1.00 mg/dL    eGFR 67 ml/min/1.73sq m   CBC and differential    Collection Time: 11/17/24  5:16 AM   Result Value Ref Range    WBC 14.78 (H) 4.31 - 10.16 Thousand/uL    RBC 5.03 3.88 - 5.62 Million/uL    Hemoglobin 14.2 12.0 - 17.0 g/dL    Hematocrit 45.6 36.5 - 49.3 %    MCV 91 82 - 98 fL    MCH 28.2 26.8 - 34.3 pg    MCHC 31.1 (L) 31.4 - 37.4 g/dL    RDW 13.9 11.6 - 15.1 %    MPV 9.3 8.9 - 12.7 fL    Platelets 264 149 - 390 Thousands/uL    nRBC 0 /100 WBCs    Segmented % 87 (H) 43 - 75 %    Immature Grans % 1 0 - 2 %    Lymphocytes % 6 (L) 14 - 44 %    Monocytes % 6 4 - 12 %    Eosinophils Relative 0 0 - 6 %    Basophils Relative 0 0 - 1 %    Absolute Neutrophils 12.88 (H) 1.85 - 7.62 Thousands/µL    Absolute Immature Grans 0.10 0.00 - 0.20 Thousand/uL    Absolute Lymphocytes 0.84 0.60 - 4.47 Thousands/µL    Absolute Monocytes 0.88 0.17 - 1.22 Thousand/µL    Eosinophils Absolute 0.04 0.00 - 0.61 Thousand/µL    Basophils Absolute 0.04 0.00 - 0.10 Thousands/µL   Comprehensive metabolic panel    Collection Time: 11/18/24  5:54 AM   Result Value Ref Range    Sodium 135 135 - 147 mmol/L    Potassium 3.8 3.5 - 5.3 mmol/L    Chloride 107 96 - 108 mmol/L    CO2 22 21 - 32 mmol/L    ANION GAP 6 4 - 13 mmol/L    BUN 9 5 - 25  mg/dL    Creatinine 0.78 0.60 - 1.30 mg/dL    Glucose 124 65 - 140 mg/dL    Calcium 8.8 8.4 - 10.2 mg/dL    Corrected Calcium 9.4 8.3 - 10.1 mg/dL    AST 99 (H) 13 - 39 U/L     (H) 7 - 52 U/L    Alkaline Phosphatase 141 (H) 34 - 104 U/L    Total Protein 6.0 (L) 6.4 - 8.4 g/dL    Albumin 3.3 (L) 3.5 - 5.0 g/dL    Total Bilirubin 4.04 (H) 0.20 - 1.00 mg/dL    eGFR 88 ml/min/1.73sq m   CBC    Collection Time: 11/18/24  5:54 AM   Result Value Ref Range    WBC 12.98 (H) 4.31 - 10.16 Thousand/uL    RBC 4.93 3.88 - 5.62 Million/uL    Hemoglobin 14.1 12.0 - 17.0 g/dL    Hematocrit 43.6 36.5 - 49.3 %    MCV 88 82 - 98 fL    MCH 28.6 26.8 - 34.3 pg    MCHC 32.3 31.4 - 37.4 g/dL    RDW 13.7 11.6 - 15.1 %    Platelets 232 149 - 390 Thousands/uL    MPV 9.1 8.9 - 12.7 fL

## 2024-11-18 NOTE — PLAN OF CARE
Problem: PAIN - ADULT  Goal: Verbalizes/displays adequate comfort level or baseline comfort level  Description: Interventions:  - Encourage patient to monitor pain and request assistance  - Assess pain using appropriate pain scale  - Administer analgesics based on type and severity of pain and evaluate response  - Implement non-pharmacological measures as appropriate and evaluate response  - Consider cultural and social influences on pain and pain management  - Notify physician/advanced practitioner if interventions unsuccessful or patient reports new pain  Outcome: Progressing     Problem: INFECTION - ADULT  Goal: Absence or prevention of progression during hospitalization  Description: INTERVENTIONS:  - Assess and monitor for signs and symptoms of infection  - Monitor lab/diagnostic results  - Monitor all insertion sites, i.e. indwelling lines, tubes, and drains  - Monitor endotracheal if appropriate and nasal secretions for changes in amount and color  - [place] appropriate cooling/warming therapies per order  - Administer medications as ordered  - Instruct and encourage patient and family to use good hand hygiene technique  - Identify and instruct in appropriate isolation precautions for identified infection/condition  Outcome: Progressing  Goal: Absence of fever/infection during neutropenic period  Description: INTERVENTIONS:  - Monitor WBC    Outcome: Progressing     Problem: SAFETY ADULT  Goal: Patient will remain free of falls  Description: INTERVENTIONS:  - Educate patient/family on patient safety including physical limitations  - Instruct patient to call for assistance with activity   - Consult OT/PT to assist with strengthening/mobility   - Keep Call bell within reach  - Keep bed low and locked with side rails adjusted as appropriate  - Keep care items and personal belongings within reach  - Initiate and maintain comfort rounds  - Make Fall Risk Sign visible to staff  - Offer Toileting every 2 Hours,  in advance of need  - Initiate/Maintain bed alarm  - Obtain necessary fall risk management equipment  - Apply yellow socks and bracelet for high fall risk patients  - Consider moving patient to room near nurses station  Outcome: Progressing  Goal: Maintain or return to baseline ADL function  Description: INTERVENTIONS:  -  Assess patient's ability to carry out ADLs; assess patient's baseline for ADL function and identify physical deficits which impact ability to perform ADLs (bathing, care of mouth/teeth, toileting, grooming, dressing, etc.)  - Assess/evaluate cause of self-care deficits   - Assess range of motion  - Assess patient's mobility; develop plan if impaired  - Assess patient's need for assistive devices and provide as appropriate  - Encourage maximum independence but intervene and supervise when necessary  - Involve family in performance of ADLs  - Assess for home care needs following discharge   - Consider OT consult to assist with ADL evaluation and planning for discharge  - Provide patient education as appropriate  Outcome: Progressing  Goal: Maintains/Returns to pre admission functional level  Description: INTERVENTIONS:  - Perform AM-PAC 6 Click Basic Mobility/ Daily Activity assessment daily.  - Set and communicate daily mobility goal to care team and patient/family/caregiver.   - Collaborate with rehabilitation services on mobility goals if consulted  - Perform Range of Motion 3 times a day.  - Reposition patient every 2 hours.  - Dangle patient 3 times a day  - Stand patient 3 times a day  - Ambulate patient 3 times a day  - Out of bed to chair 3 times a day   - Out of bed for meals 3 times a day  - Out of bed for toileting  - Record patient progress and toleration of activity level   Outcome: Progressing     Problem: DISCHARGE PLANNING  Goal: Discharge to home or other facility with appropriate resources  Description: INTERVENTIONS:  - Identify barriers to discharge w/patient and caregiver  -  Arrange for needed discharge resources and transportation as appropriate  - Identify discharge learning needs (meds, wound care, etc.)  - Arrange for interpretive services to assist at discharge as needed  - Refer to Case Management Department for coordinating discharge planning if the patient needs post-hospital services based on physician/advanced practitioner order or complex needs related to functional status, cognitive ability, or social support system  Outcome: Progressing     Problem: Knowledge Deficit  Goal: Patient/family/caregiver demonstrates understanding of disease process, treatment plan, medications, and discharge instructions  Description: Complete learning assessment and assess knowledge base.  Interventions:  - Provide teaching at level of understanding  - Provide teaching via preferred learning methods  Outcome: Progressing

## 2024-11-18 NOTE — PLAN OF CARE
Problem: PAIN - ADULT  Goal: Verbalizes/displays adequate comfort level or baseline comfort level  Description: Interventions:  - Encourage patient to monitor pain and request assistance  - Assess pain using appropriate pain scale  - Administer analgesics based on type and severity of pain and evaluate response  - Implement non-pharmacological measures as appropriate and evaluate response  - Consider cultural and social influences on pain and pain management  - Notify physician/advanced practitioner if interventions unsuccessful or patient reports new pain  Outcome: Progressing     Problem: INFECTION - ADULT  Goal: Absence or prevention of progression during hospitalization  Description: INTERVENTIONS:  - Assess and monitor for signs and symptoms of infection  - Monitor lab/diagnostic results  - Monitor all insertion sites, i.e. indwelling lines, tubes, and drains  - Monitor endotracheal if appropriate and nasal secretions for changes in amount and color  - [place] appropriate cooling/warming therapies per order  - Administer medications as ordered  - Instruct and encourage patient and family to use good hand hygiene technique  - Identify and instruct in appropriate isolation precautions for identified infection/condition  Outcome: Progressing  Goal: Absence of fever/infection during neutropenic period  Description: INTERVENTIONS:  - Monitor WBC    Outcome: Progressing     Problem: DISCHARGE PLANNING  Goal: Discharge to home or other facility with appropriate resources  Description: INTERVENTIONS:  - Identify barriers to discharge w/patient and caregiver  - Arrange for needed discharge resources and transportation as appropriate  - Identify discharge learning needs (meds, wound care, etc.)  - Arrange for interpretive services to assist at discharge as needed  - Refer to Case Management Department for coordinating discharge planning if the patient needs post-hospital services based on physician/advanced  practitioner order or complex needs related to functional status, cognitive ability, or social support system  Outcome: Progressing     Problem: Knowledge Deficit  Goal: Patient/family/caregiver demonstrates understanding of disease process, treatment plan, medications, and discharge instructions  Description: Complete learning assessment and assess knowledge base.  Interventions:  - Provide teaching at level of understanding  - Provide teaching via preferred learning methods  Outcome: Progressing

## 2024-11-18 NOTE — ASSESSMENT & PLAN NOTE
Presenting with abdominal pain  CTAP with cholelithiasis and concern for acute cholecystitis  Continue ceftriaxone and Flagyl  Surgery following  11/17/24 RUQ U/S: Moderate fatty liver. Gallbladder contains stones and sludge. No evidence of acute cholecystitis. No evidence of biliary ductal dilatation or choledocholithiasis.   Acute cholecystitis likely ruled out based on this imaging study although the patient had already been on antibiotics prior to the right upper quadrant ultrasound.

## 2024-11-19 VITALS
WEIGHT: 220.46 LBS | BODY MASS INDEX: 34.6 KG/M2 | SYSTOLIC BLOOD PRESSURE: 144 MMHG | DIASTOLIC BLOOD PRESSURE: 78 MMHG | HEART RATE: 84 BPM | HEIGHT: 67 IN | OXYGEN SATURATION: 93 % | RESPIRATION RATE: 18 BRPM | TEMPERATURE: 98.2 F

## 2024-11-19 LAB
ALBUMIN SERPL BCG-MCNC: 3.1 G/DL (ref 3.5–5)
ALP SERPL-CCNC: 153 U/L (ref 34–104)
ALT SERPL W P-5'-P-CCNC: 189 U/L (ref 7–52)
ANION GAP SERPL CALCULATED.3IONS-SCNC: 6 MMOL/L (ref 4–13)
AST SERPL W P-5'-P-CCNC: 61 U/L (ref 13–39)
BACTERIA BLD CULT: ABNORMAL
BACTERIA BLD CULT: ABNORMAL
BILIRUB SERPL-MCNC: 3.07 MG/DL (ref 0.2–1)
BUN SERPL-MCNC: 10 MG/DL (ref 5–25)
CALCIUM ALBUM COR SERPL-MCNC: 9.1 MG/DL (ref 8.3–10.1)
CALCIUM SERPL-MCNC: 8.4 MG/DL (ref 8.4–10.2)
CHLORIDE SERPL-SCNC: 110 MMOL/L (ref 96–108)
CO2 SERPL-SCNC: 21 MMOL/L (ref 21–32)
CREAT SERPL-MCNC: 0.78 MG/DL (ref 0.6–1.3)
ERYTHROCYTE [DISTWIDTH] IN BLOOD BY AUTOMATED COUNT: 13.9 % (ref 11.6–15.1)
GFR SERPL CREATININE-BSD FRML MDRD: 88 ML/MIN/1.73SQ M
GLUCOSE SERPL-MCNC: 127 MG/DL (ref 65–140)
GRAM STN SPEC: ABNORMAL
HCT VFR BLD AUTO: 41.5 % (ref 36.5–49.3)
HGB BLD-MCNC: 13.5 G/DL (ref 12–17)
MCH RBC QN AUTO: 28.4 PG (ref 26.8–34.3)
MCHC RBC AUTO-ENTMCNC: 32.5 G/DL (ref 31.4–37.4)
MCV RBC AUTO: 87 FL (ref 82–98)
PLATELET # BLD AUTO: 225 THOUSANDS/UL (ref 149–390)
PMV BLD AUTO: 9.1 FL (ref 8.9–12.7)
POTASSIUM SERPL-SCNC: 3.9 MMOL/L (ref 3.5–5.3)
PROT SERPL-MCNC: 5.7 G/DL (ref 6.4–8.4)
RBC # BLD AUTO: 4.76 MILLION/UL (ref 3.88–5.62)
S AUREUS+CONS DNA BLD POS NAA+NON-PROBE: DETECTED
SODIUM SERPL-SCNC: 137 MMOL/L (ref 135–147)
WBC # BLD AUTO: 9.36 THOUSAND/UL (ref 4.31–10.16)

## 2024-11-19 PROCEDURE — 80053 COMPREHEN METABOLIC PANEL: CPT | Performed by: HOSPITALIST

## 2024-11-19 PROCEDURE — NC001 PR NO CHARGE: Performed by: PHYSICIAN ASSISTANT

## 2024-11-19 PROCEDURE — 85027 COMPLETE CBC AUTOMATED: CPT | Performed by: HOSPITALIST

## 2024-11-19 PROCEDURE — 99232 SBSQ HOSP IP/OBS MODERATE 35: CPT | Performed by: SURGERY

## 2024-11-19 PROCEDURE — 99239 HOSP IP/OBS DSCHRG MGMT >30: CPT | Performed by: INTERNAL MEDICINE

## 2024-11-19 RX ADMIN — DEXTROSE, SODIUM CHLORIDE, AND POTASSIUM CHLORIDE 175 ML/HR: 5; .45; .15 INJECTION INTRAVENOUS at 07:27

## 2024-11-19 RX ADMIN — METRONIDAZOLE 500 MG: 500 INJECTION, SOLUTION INTRAVENOUS at 10:04

## 2024-11-19 RX ADMIN — DEXTROSE, SODIUM CHLORIDE, AND POTASSIUM CHLORIDE 175 ML/HR: 5; .45; .15 INJECTION INTRAVENOUS at 00:43

## 2024-11-19 RX ADMIN — LISINOPRIL 10 MG: 10 TABLET ORAL at 10:03

## 2024-11-19 NOTE — CONSULTS
Please see consult note completed by Jesus Villafana PA-C, on 11/16/2024.  Consult order was unable to be linked to original consult note.

## 2024-11-19 NOTE — PROGRESS NOTES
Progress Note - Surgery-General   Name: Alonzo Babin 73 y.o. male I MRN: [id number]  Unit/Bed#: -01 I Date of Admission: 11/15/2024   Date of Service: 11/19/2024 I Hospital Day: 4    Assessment & Plan  Gallstone pancreatitis  Transaminitis, improving  Pt tolerated clear liquid diet, and no increase in pain,    AVSS, WBC 13 from 14.8, T bilirubin 4 from 5, liver enzymes downtrending    Plan:  Advance to soft surgical diet, if tolerated then ok for discharge today, 11/19, with follow up in [place] for interval cholecystomy.      Okay for clear liquid diet at this time since abdominal pain is significantly improved  We do not anticipate surgical intervention this admission as patient is from [place] and would like to follow-up with his surgeon there.  Given his severity of pancreatitis I believe an interval laparoscopic cholecystectomy is appropriate within a 4 to 6 week timeframe.  Analgesia and antiemetics, as needed  Monitor for evidence of cholangitis  Monitor labs and vitals and trend LFTs for improvement  Serial abdominal exams  DVT prophylaxis  Encourage ambulation and incentive spirometry  Remainder of care per primary team  General Surgery will continue to follow  HTN (hypertension)  Management per primary team  Cholecystitis                  Subjective  I feel much better, I already have an appointment with a surgeon in [place]. I tolerated the clear liquid diet without  n/v/or pain.           EXAM:   A & O x 3, pt sitting in chair at time of exam  LUngs are clear  COR RRR  ABD: no tenderness with palpation  Extrem: no calf tnderness  Neuro, no focal deficits     Objective :  Temp:  [98.2 °F (36.8 °C)-99.1 °F (37.3 °C)] 98.2 °F (36.8 °C)  HR:  [72-84] 84  BP: (129-147)/(80-95) 129/81  Resp:  [18] 18  SpO2:  [90 %-93 %] 92 %  O2 Device: Nasal cannula  Nasal Cannula O2 Flow Rate (L/min):  [2 L/min] 2 L/min    I/O         11/17 0701 11/18 0700 11/18 0701 11/19 0700 11/19 0701 11/20 0700     P.O.  0     I.V. (mL/kg)  1000 (10)     IV Piggyback       Total Intake(mL/kg)  1000 (10)     Urine (mL/kg/hr) 1250 (0.5) 1275 (0.5)     Total Output 1250 1275     Net -1250 -232                        Lab Results: I have reviewed the following results:  Recent Labs     11/19/24  0511   WBC 9.36   HGB 13.5   HCT 41.5      SODIUM 137   K 3.9   *   CO2 21   BUN 10   CREATININE 0.78   GLUC 127   AST 61*   *   ALB 3.1*   TBILI 3.07*   ALKPHOS 153*       Stephanie Carrasquillo    PAC

## 2024-11-19 NOTE — DISCHARGE INSTR - AVS FIRST PAGE
Continue with soft surgical diet, eat slowly, small amounts, frequent meals, avoid high fat meals or processed foods.  Continue pain medication as needed, if the pain increases, nausea or vomiting then call you surgeon   Follow up care with surgeon in [place] for interval cholecystectomy,

## 2024-11-19 NOTE — ASSESSMENT & PLAN NOTE
Transaminitis, improving  Pt tolerated clear liquid diet, and no increase in pain,    AVSS, WBC 13 from 14.8, T bilirubin 4 from 5, liver enzymes downtrending    Plan:  Advance to soft surgical diet, if tolerated then ok for discharge today, 11/19, with follow up in [place] for interval cholecystomy.      Okay for clear liquid diet at this time since abdominal pain is significantly improved  We do not anticipate surgical intervention this admission as patient is from [place] and would like to follow-up with his surgeon there.  Given his severity of pancreatitis I believe an interval laparoscopic cholecystectomy is appropriate within a 4 to 6 week timeframe.  Analgesia and antiemetics, as needed  Monitor for evidence of cholangitis  Monitor labs and vitals and trend LFTs for improvement  Serial abdominal exams  DVT prophylaxis  Encourage ambulation and incentive spirometry  Remainder of care per primary team  General Surgery will continue to follow

## 2024-11-19 NOTE — PLAN OF CARE
Problem: PAIN - ADULT  Goal: Verbalizes/displays adequate comfort level or baseline comfort level  Description: Interventions:  - Encourage patient to monitor pain and request assistance  - Assess pain using appropriate pain scale  - Administer analgesics based on type and severity of pain and evaluate response  - Implement non-pharmacological measures as appropriate and evaluate response  - Consider cultural and social influences on pain and pain management  - Notify physician/advanced practitioner if interventions unsuccessful or patient reports new pain  11/19/2024 1523 by Ольга Landry RN  Outcome: Adequate for Discharge  11/19/2024 1459 by Ольга Landry RN  Outcome: Progressing     Problem: INFECTION - ADULT  Goal: Absence or prevention of progression during hospitalization  Description: INTERVENTIONS:  - Assess and monitor for signs and symptoms of infection  - Monitor lab/diagnostic results  - Monitor all insertion sites, i.e. indwelling lines, tubes, and drains  - Monitor endotracheal if appropriate and nasal secretions for changes in amount and color  - [place] appropriate cooling/warming therapies per order  - Administer medications as ordered  - Instruct and encourage patient and family to use good hand hygiene technique  - Identify and instruct in appropriate isolation precautions for identified infection/condition  11/19/2024 1523 by Ольга Landry RN  Outcome: Adequate for Discharge  11/19/2024 1459 by Ольга Landry RN  Outcome: Progressing  Goal: Absence of fever/infection during neutropenic period  Description: INTERVENTIONS:  - Monitor WBC    11/19/2024 1523 by Ольга Landry RN  Outcome: Adequate for Discharge  11/19/2024 1459 by Ольга Landry RN  Outcome: Progressing     Problem: SAFETY ADULT  Goal: Patient will remain free of falls  Description: INTERVENTIONS:  - Educate patient/family on patient safety including physical limitations  - Instruct patient to call for assistance with activity   -  Consult OT/PT to assist with strengthening/mobility   - Keep Call bell within reach  - Keep bed low and locked with side rails adjusted as appropriate  - Keep care items and personal belongings within reach  - Initiate and maintain comfort rounds  - Make Fall Risk Sign visible to staff  - Offer Toileting every 2 Hours, in advance of need  - Initiate/Maintain 2alarm  - Obtain necessary fall risk management equipment: 2  - Apply yellow socks and bracelet for high fall risk patients  - Consider moving patient to room near nurses station  11/19/2024 1523 by Ольга Landry RN  Outcome: Adequate for Discharge  11/19/2024 1459 by Ольга Landry RN  Outcome: Progressing  Goal: Maintain or return to baseline ADL function  Description: INTERVENTIONS:  -  Assess patient's ability to carry out ADLs; assess patient's baseline for ADL function and identify physical deficits which impact ability to perform ADLs (bathing, care of mouth/teeth, toileting, grooming, dressing, etc.)  - Assess/evaluate cause of self-care deficits   - Assess range of motion  - Assess patient's mobility; develop plan if impaired  - Assess patient's need for assistive devices and provide as appropriate  - Encourage maximum independence but intervene and supervise when necessary  - Involve family in performance of ADLs  - Assess for home care needs following discharge   - Consider OT consult to assist with ADL evaluation and planning for discharge  - Provide patient education as appropriate  11/19/2024 1523 by Ольга Landry RN  Outcome: Adequate for Discharge  11/19/2024 1459 by Ольга Landry RN  Outcome: Progressing  Goal: Maintains/Returns to pre admission functional level  Description: INTERVENTIONS:  - Perform AM-PAC 6 Click Basic Mobility/ Daily Activity assessment daily.  - Set and communicate daily mobility goal to care team and patient/family/caregiver.   - Collaborate with rehabilitation services on mobility goals if consulted  - Perform Range of Motion  2 times a day.  - Reposition patient every 2 hours.  - Dangle patient 2 times a day  - Stand patient 2 times a day  - Ambulate patient 2 times a day  - Out of bed to chair 2 times a day   - Out of bed for meals 2 times a day  - Out of bed for toileting  - Record patient progress and toleration of activity level   11/19/2024 1523 by Ольга Landry RN  Outcome: Adequate for Discharge  11/19/2024 1459 by Ольга Landry RN  Outcome: Progressing     Problem: DISCHARGE PLANNING  Goal: Discharge to home or other facility with appropriate resources  Description: INTERVENTIONS:  - Identify barriers to discharge w/patient and caregiver  - Arrange for needed discharge resources and transportation as appropriate  - Identify discharge learning needs (meds, wound care, etc.)  - Arrange for interpretive services to assist at discharge as needed  - Refer to Case Management Department for coordinating discharge planning if the patient needs post-hospital services based on physician/advanced practitioner order or complex needs related to functional status, cognitive ability, or social support system  11/19/2024 1523 by Ольга Landry RN  Outcome: Adequate for Discharge  11/19/2024 1459 by Ольга Landry RN  Outcome: Progressing     Problem: Knowledge Deficit  Goal: Patient/family/caregiver demonstrates understanding of disease process, treatment plan, medications, and discharge instructions  Description: Complete learning assessment and assess knowledge base.  Interventions:  - Provide teaching at level of understanding  - Provide teaching via preferred learning methods  11/19/2024 1523 by Ольга Landry RN  Outcome: Adequate for Discharge  11/19/2024 1459 by Ольга Landry RN  Outcome: Progressing     Problem: GASTROINTESTINAL - ADULT  Goal: Maintains adequate nutritional intake  Description: INTERVENTIONS:  - Monitor percentage of each meal consumed  - Identify factors contributing to decreased intake, treat as appropriate  - Assist with meals  as needed  - Monitor I&O, weight, and lab values if indicated  - Obtain nutrition services referral as needed  11/19/2024 1523 by Ольга Landry, RN  Outcome: Adequate for Discharge  11/19/2024 1459 by Ольга Landry, RN  Outcome: Progressing

## 2024-11-19 NOTE — DISCHARGE SUMMARY
Discharge Summary - Hospitalist   Name: Alonzo Babin 73 y.o. male I MRN: [id number]  Unit/Bed#: [facility] I Date of Admission: 11/15/2024   Date of Service: 11/19/2024 I Hospital Day: 4       Discharge Diagnosis:    Gallstone pancreatitis  Gallbladder sludge  Transaminitis  HTN      Discharging Physician / Practitioner: Otoniel Grace MD  PCP: No primary care provider on file.  Admission Date:   Admission Orders (From admission, onward)       Ordered        11/15/24 1557  INPATIENT ADMISSION  Once                          Discharge Date: 11/19/24      Consultations During Hospital Stay:  GI  Surgery    Significant Findings / Test Results:   Procedure Component Value Units Date/Time   US right upper quadrant [[id number]] Collected: 11/17/24 1641   Order Status: Completed Updated: 11/17/24 1649   Narrative:     RIGHT UPPER QUADRANT ULTRASOUND    INDICATION: stones.    COMPARISON: Abdomen and pelvic CT from 11/15/2024 and abdomen MR from 11/16/2024.    TECHNIQUE: Real-time ultrasound of the right upper quadrant was performed with a curvilinear transducer with both volumetric sweeps and still imaging techniques.    FINDINGS:    PANCREAS: Visualized portions of the pancreas are within normal limits.    AORTA AND IVC: Visualized portions are normal for patient age.    LIVER:  Size: Within normal range. The liver measures 16.8 cm in the midclavicular line.  Contour: Surface contour is smooth.  Parenchyma: There is moderate diffuse increased echogenicity with smooth echotexture and acoustic beam attenuation. Most consistent with moderate hepatic steatosis.  No liver mass identified.  Limited imaging of the main portal vein shows it to be patent and hepatopetal.    BILIARY:  The gallbladder is normal in caliber.  Gallbladder wall thickened to 5 mm  There are gallstones and sludge in the gallbladder lumen.  No sonographic Tyler sign.  No intrahepatic biliary dilatation.  CBD measures 4.0 mm.  No  choledocholithiasis.    KIDNEY:  Right kidney measures 11.4 x 5.9 x 5.4 cm. Volume 192.1 mL  Kidney within normal limits.    ASCITES: None.   Impression:       Moderate fatty liver.    Gallbladder contains stones and sludge. No evidence of acute cholecystitis.    No evidence of biliary ductal dilatation or choledocholithiasis.    Workstation performed: [id number]   MRI abdomen wo contrast and mrcp [[id number]] Collected: 11/17/24 0915   Order Status: Completed Updated: 11/17/24 0932   Narrative:     MRI OF THE ABDOMEN WITHOUT CONTRAST WITH MRCP    INDICATION: 73 years / Male. stones.    COMPARISON: CT performed November 15, 2024.    TECHNIQUE: Multiplanar/multisequence MRI of the abdomen with 3D MRCP was performed without the administration of contrast.    FINDINGS: Some image quality degradation related to unavoidable respiratory motion artifact.    LOWER CHEST: Bibasilar atelectasis.    LIVER:  Normal in size and configuration.  No noncontrast evidence for suspicious hepatic mass.  Limited evaluation of hepatic and portal veins on this non-contrast MRI is unremarkable.    BILE DUCTS: No intrahepatic or extrahepatic bile duct dilation. Common bile duct is normal in caliber. No choledocholithiasis, biliary stricture or suspicious mass. Of note, cystic duct which was intensely enhancing on contrast-enhanced CT examination  performed 1 day earlier is not visible by MRCP.    GALLBLADDER: Incompletely distended with lobulated and intraluminal gallstones but no wall thickening, surrounding pericholecystic edema or pericholecystic fluid.    PANCREAS: Inflammatory changes and edema in a pattern consistent with interstitial edematous pancreatitis. No pancreatic ductal enlargement. No noncontrast MR evidence for solid or cystic pancreatic mass.    ADRENAL GLANDS: Unremarkable.    SPLEEN: Normal.    KIDNEYS/PROXIMAL URETERS: No hydroureteronephrosis. No suspicious renal mass.    BOWEL: Metal artifact associated with structure  "in the dependent lumen of the gastric fundus. Sliding-type hiatal hernia. Reactive edema surrounding the duodenum.    PERITONEUM/RETROPERITONEUM: Peripancreatic inflammatory edema with some edematous changes settling along the ventral borders of pararenal fascia in a pattern typical of interstitial edematous pancreatitis. No jocelyn pancreatic or peripancreatic fluid  collection.    LYMPH NODES: No abdominal lymphadenopathy.    VESSELS: No aneurysm.    ABDOMINAL WALL: Unremarkable    BONES: No suspicious osseous lesion.   Impression:       No MRCP evidence for choledocholithiasis with no evidence for pancreatic or biliary ductal dilatation.    Cholelithiasis. Gallbladder demonstrates a lobulated incompletely distended appearance that can be seen in the setting of gallbladder adenomyomatosis.    Acute interstitial edematous pancreatitis. Nonvisualization of the cystic duct which was intensely enhancing at the time of yesterday's CT examination suggesting some element of cholangitis.    Artifact associated with punctate metallic density in the dependent lumen of the gallbladder fundus corresponding to similar findings seen in that location in retrospect on recent CT. Correlate for recent ingestion of tiny metallic substance.    This examination was marked \"immediate notification\" in Epic in order to begin the standard process by which the radiology reading room liaison alerts the referring practitioner.      Workstation performed: [id number]   CT abdomen pelvis with contrast [[id number]] Collected: 11/15/24 1536   Order Status: Completed Updated: 11/15/24 1546   Narrative:     CT ABDOMEN AND PELVIS WITH IV CONTRAST    INDICATION: Abdominal pain, vomiting. .    COMPARISON: None.    TECHNIQUE: CT examination of the abdomen and pelvis was performed. Multiplanar 2D reformatted images were created from the source data.    This examination, like all CT scans performed in the [facility], was performed utilizing " techniques to minimize radiation dose exposure, including the use of iterative reconstruction and automated exposure control. Radiation dose length  product (DLP) for this visit: 1947 mGy-cm    IV Contrast: 100 mL of iohexol (OMNIPAQUE)  Enteric Contrast: Not administered.    FINDINGS:    ABDOMEN    INCLUDED CHEST: Bibasilar atelectasis. Multivessel coronary artery disease.    LIVER/BILIARY TREE: Hepatic steatosis. No biliary dilatation.    GALLBLADDER: Cholelithiasis. Inflammatory stranding around the gallbladder and extrahepatic CBD.    SPLEEN: Unremarkable.    PANCREAS: Inflammatory changes around the pancreatic head. No peripancreatic fluid collection. No pancreatic ductal dilatation.    ADRENAL GLANDS: Unremarkable.    KIDNEYS/URETERS: Unremarkable. No hydronephrosis.    STOMACH AND BOWEL: Small hiatal hernia. Diverticulosis. No bowel obstruction or inflammation.    APPENDIX: No findings to suggest appendicitis.    ABDOMINOPELVIC CAVITY: No ascites. No pneumoperitoneum. No lymphadenopathy.    VESSELS: Unremarkable for patient's age.    PELVIS    REPRODUCTIVE ORGANS: Enlarged prostate.    URINARY BLADDER: Unremarkable.    ABDOMINAL WALL/INGUINAL REGIONS: Unremarkable.    BONES: No acute fracture or suspicious osseous lesion. Spinal degenerative changes.   Impression:       Cholelithiasis. Inflammatory changes around the gallbladder suspicious for acute cholecystitis. Recommend correlation with clinical exam and right upper quadrant ultrasound.    Inflammatory changes around the pancreatic head which may be reactive, although a concomitant mild acute pancreatitis is not excluded.    Hepatic steatosis.      Findings communicated to XAVIER LEHMAN on 11/15/2024 3:43 PM.          Workstation performed: [id number]   XR chest 2 views [[id number]] Collected: 11/15/24 1522   Order Status: Completed Updated: 11/15/24 1525   Narrative:     XR CHEST PA AND LATERAL    INDICATION: upper ap.    COMPARISON:  "None    FINDINGS:  Poor inspiratory effort.  Clear lungs. No pneumothorax or pleural effusion.    Normal cardiomediastinal silhouette.    Bones are unremarkable for age.    Normal upper abdomen.   Impression:       No acute cardiopulmonary disease.        Outpatient follow up Requested:  PCP  Surgery    Complications:  None    Reason for Admission: Abdominal pain    HPI:  Alonzo Babin is a 73 y.o. male with a PMH of hypertension who presents with epigastric abdominal pain.  Was found to have pancreatitis and cholecystitis on imaging.  Was covered with antibiotics.  Pain is improving overall.  No nausea or vomiting.  No previous episodes of pancreatitis, no alcohol use.  Surgery was consulted and will see the patient tomorrow.     Hospital Course:     The patient was hospitalized, seen by GI and Surgery  GI believed diagnosis was mostly pancreatitis due to passed stone. MRI of the abdomen did not reveal any choledocolitiasis.  Surgery did recommend interval cholecystectomy but patient would prefer to follow with his surgeon in [place].  Patient stable and tolerating diet otherwise, received ceftriaxone and Flagyl here due to initial concern with cholecystitis but no sepsis currently.  He feels much better and wishes to go home.    Condition at Discharge: good     Discharge Day Visit / Exam:     Subjective:    Doing well, tolerating diet  Denies chest pain, nausea, vomiting.  Vitals: Blood Pressure: 144/80 (11/19/24 1007)  Pulse: 84 (11/19/24 0731)  Temperature: 98.2 °F (36.8 °C) (11/18/24 2251)  Temp Source: Oral (11/18/24 1545)  Respirations: 18 (11/18/24 2251)  Height: 5' 7\" (170.2 cm) (11/15/24 2003)  Weight - Scale: 100 kg (220 lb 7.4 oz) (11/15/24 2003)  SpO2: 92 % (11/19/24 0731)    Exam:   Physical Exam  Vitals and nursing note reviewed.   Constitutional:       General: He is not in acute distress.     Appearance: Normal appearance. He is not ill-appearing, toxic-appearing or diaphoretic.   HENT:      Head: " Normocephalic and atraumatic.      Right Ear: External ear normal.      Left Ear: External ear normal.      Nose: Nose normal. No congestion or rhinorrhea.      Mouth/Throat:      Mouth: Mucous membranes are moist.      Pharynx: Oropharynx is clear. No oropharyngeal exudate or posterior oropharyngeal erythema.   Eyes:      General: No scleral icterus.        Right eye: No discharge.         Left eye: No discharge.      Pupils: Pupils are equal, round, and reactive to light.   Neck:      Vascular: No carotid bruit.   Cardiovascular:      Rate and Rhythm: Normal rate and regular rhythm.      Pulses: Normal pulses.      Heart sounds: No murmur heard.     No friction rub. No gallop.   Pulmonary:      Effort: Pulmonary effort is normal. No respiratory distress.      Breath sounds: Normal breath sounds. No stridor. No wheezing, rhonchi or rales.   Abdominal:      General: Abdomen is flat. Bowel sounds are normal. There is no distension.      Palpations: Abdomen is soft. There is no mass.      Tenderness: There is no abdominal tenderness. There is no guarding or rebound.      Hernia: No hernia is present.   Musculoskeletal:         General: No swelling, tenderness, deformity or signs of injury. Normal range of motion.      Cervical back: Normal range of motion. No rigidity. No muscular tenderness.   Lymphadenopathy:      Cervical: No cervical adenopathy.   Skin:     General: Skin is warm and dry.      Capillary Refill: Capillary refill takes less than 2 seconds.      Coloration: Skin is not jaundiced or pale.      Findings: No bruising or erythema.   Neurological:      General: No focal deficit present.      Mental Status: He is alert and oriented to person, place, and time. Mental status is at baseline.      Cranial Nerves: No cranial nerve deficit.      Sensory: No sensory deficit.      Motor: No weakness.      Coordination: Coordination normal.      Deep Tendon Reflexes: Reflexes normal.   Psychiatric:         Mood and  Affect: Mood normal.         Behavior: Behavior normal.         Thought Content: Thought content normal.         Judgment: Judgment normal.         Discussion with Family: Patient - he did not request me to talk to family at this time    Discharge instructions/Information to patient and family:   See after visit summary for information provided to patient and family.      Provisions for Follow-Up Care:  See after visit summary for information related to follow-up care and any pertinent home health orders.      Disposition:     Home    For Discharges to [facility]:   Not Applicable to this Patient - Not Applicable to this Patient    Planned Readmission: No     Discharge Statement:  I spent 91 minutes discharging the patient. This time was spent on the day of discharge. I had direct contact with the patient on the day of discharge. Greater than 50% of the total time was spent examining patient, answering all patient questions, arranging and discussing plan of care with patient as well as directly providing post-discharge instructions.  Additional time then spent on discharge activities.    Discharge Medications:  See after visit summary for reconciled discharge medications provided to patient and family.      ** Please Note: This note has been constructed using a voice recognition system **

## 2024-11-19 NOTE — PLAN OF CARE
Problem: PAIN - ADULT  Goal: Verbalizes/displays adequate comfort level or baseline comfort level  Description: Interventions:  - Encourage patient to monitor pain and request assistance  - Assess pain using appropriate pain scale  - Administer analgesics based on type and severity of pain and evaluate response  - Implement non-pharmacological measures as appropriate and evaluate response  - Consider cultural and social influences on pain and pain management  - Notify physician/advanced practitioner if interventions unsuccessful or patient reports new pain  Outcome: Progressing     Problem: INFECTION - ADULT  Goal: Absence or prevention of progression during hospitalization  Description: INTERVENTIONS:  - Assess and monitor for signs and symptoms of infection  - Monitor lab/diagnostic results  - Monitor all insertion sites, i.e. indwelling lines, tubes, and drains  - Monitor endotracheal if appropriate and nasal secretions for changes in amount and color  - [place] appropriate cooling/warming therapies per order  - Administer medications as ordered  - Instruct and encourage patient and family to use good hand hygiene technique  - Identify and instruct in appropriate isolation precautions for identified infection/condition  Outcome: Progressing  Goal: Absence of fever/infection during neutropenic period  Description: INTERVENTIONS:  - Monitor WBC    Outcome: Progressing     Problem: SAFETY ADULT  Goal: Patient will remain free of falls  Description: INTERVENTIONS:  - Educate patient/family on patient safety including physical limitations  - Instruct patient to call for assistance with activity   - Consult OT/PT to assist with strengthening/mobility   - Keep Call bell within reach  - Keep bed low and locked with side rails adjusted as appropriate  - Keep care items and personal belongings within reach  - Initiate and maintain comfort rounds  - Make Fall Risk Sign visible to staff  - Offer Toileting every 2 Hours,  in advance of need  - Initiate/Maintain 2alarm  - Obtain necessary fall risk management equipment: 2  - Apply yellow socks and bracelet for high fall risk patients  - Consider moving patient to room near nurses station  Outcome: Progressing  Goal: Maintain or return to baseline ADL function  Description: INTERVENTIONS:  -  Assess patient's ability to carry out ADLs; assess patient's baseline for ADL function and identify physical deficits which impact ability to perform ADLs (bathing, care of mouth/teeth, toileting, grooming, dressing, etc.)  - Assess/evaluate cause of self-care deficits   - Assess range of motion  - Assess patient's mobility; develop plan if impaired  - Assess patient's need for assistive devices and provide as appropriate  - Encourage maximum independence but intervene and supervise when necessary  - Involve family in performance of ADLs  - Assess for home care needs following discharge   - Consider OT consult to assist with ADL evaluation and planning for discharge  - Provide patient education as appropriate  Outcome: Progressing  Goal: Maintains/Returns to pre admission functional level  Description: INTERVENTIONS:  - Perform AM-PAC 6 Click Basic Mobility/ Daily Activity assessment daily.  - Set and communicate daily mobility goal to care team and patient/family/caregiver.   - Collaborate with rehabilitation services on mobility goals if consulted  - Perform Range of Motion 2 times a day.  - Reposition patient every 2 hours.  - Dangle patient 2 times a day  - Stand patient 2 times a day  - Ambulate patient 2 times a day  - Out of bed to chair 2 times a day   - Out of bed for meals 2 times a day  - Out of bed for toileting  - Record patient progress and toleration of activity level   Outcome: Progressing     Problem: DISCHARGE PLANNING  Goal: Discharge to home or other facility with appropriate resources  Description: INTERVENTIONS:  - Identify barriers to discharge w/patient and caregiver  -  Arrange for needed discharge resources and transportation as appropriate  - Identify discharge learning needs (meds, wound care, etc.)  - Arrange for interpretive services to assist at discharge as needed  - Refer to Case Management Department for coordinating discharge planning if the patient needs post-hospital services based on physician/advanced practitioner order or complex needs related to functional status, cognitive ability, or social support system  Outcome: Progressing     Problem: Knowledge Deficit  Goal: Patient/family/caregiver demonstrates understanding of disease process, treatment plan, medications, and discharge instructions  Description: Complete learning assessment and assess knowledge base.  Interventions:  - Provide teaching at level of understanding  - Provide teaching via preferred learning methods  Outcome: Progressing     Problem: GASTROINTESTINAL - ADULT  Goal: Maintains adequate nutritional intake  Description: INTERVENTIONS:  - Monitor percentage of each meal consumed  - Identify factors contributing to decreased intake, treat as appropriate  - Assist with meals as needed  - Monitor I&O, weight, and lab values if indicated  - Obtain nutrition services referral as needed  Outcome: Progressing

## 2024-11-20 LAB — BACTERIA BLD CULT: NORMAL

## 2024-11-27 PROBLEM — I10 ESSENTIAL (PRIMARY) HYPERTENSION: Chronic | Status: ACTIVE | Noted: 2024-08-02

## 2024-12-02 PROBLEM — Z00.00 ENCOUNTER FOR PREVENTIVE HEALTH EXAMINATION: Status: ACTIVE | Noted: 2024-12-02

## 2024-12-10 ENCOUNTER — APPOINTMENT (OUTPATIENT)
Dept: SURGERY | Facility: CLINIC | Age: 74
End: 2024-12-10
Payer: MEDICARE

## 2024-12-10 VITALS
TEMPERATURE: 97.7 F | OXYGEN SATURATION: 95 % | SYSTOLIC BLOOD PRESSURE: 131 MMHG | WEIGHT: 200 LBS | HEIGHT: 67 IN | HEART RATE: 78 BPM | BODY MASS INDEX: 31.39 KG/M2 | DIASTOLIC BLOOD PRESSURE: 84 MMHG

## 2024-12-10 DIAGNOSIS — Z87.891 PERSONAL HISTORY OF NICOTINE DEPENDENCE: ICD-10-CM

## 2024-12-10 DIAGNOSIS — I10 ESSENTIAL (PRIMARY) HYPERTENSION: ICD-10-CM

## 2024-12-10 DIAGNOSIS — Z80.1 FAMILY HISTORY OF MALIGNANT NEOPLASM OF TRACHEA, BRONCHUS AND LUNG: ICD-10-CM

## 2024-12-10 DIAGNOSIS — Z78.9 OTHER SPECIFIED HEALTH STATUS: ICD-10-CM

## 2024-12-10 DIAGNOSIS — K80.10 CALCULUS OF GALLBLADDER WITH CHRONIC CHOLECYSTITIS W/OUT OBSTRUCTION: ICD-10-CM

## 2024-12-10 DIAGNOSIS — K85.10 BILIARY ACUTE PANCREATITIS WITHOUT NECROSIS OR INFECTION: ICD-10-CM

## 2024-12-10 DIAGNOSIS — N23 UNSPECIFIED RENAL COLIC: ICD-10-CM

## 2024-12-10 PROCEDURE — 99204 OFFICE O/P NEW MOD 45 MIN: CPT

## 2024-12-10 RX ORDER — PANTOPRAZOLE 40 MG/1
40 TABLET, DELAYED RELEASE ORAL
Refills: 0 | Status: ACTIVE | COMMUNITY

## 2024-12-10 RX ORDER — PANCRELIPASE LIPASE, PANCRELIPASE AMYLASE, AND PANCRELIPASE PROTEASE 149900; 37000; 97300 [USP'U]/1; [USP'U]/1; [USP'U]/1
CAPSULE, DELAYED RELEASE ORAL
Refills: 0 | Status: ACTIVE | COMMUNITY

## 2024-12-10 RX ORDER — BIFIDOBACTERIUM LONGUM SUBSP. INFANTIS, AVOBENZONE, HOMOSALATE, OCTISALATE, OCTOCRYLENE, AND OXYBENZONE
KIT
Refills: 0 | Status: ACTIVE | COMMUNITY

## 2024-12-10 RX ORDER — ENALAPRIL MALEATE 5 MG/1
5 TABLET ORAL
Refills: 0 | Status: ACTIVE | COMMUNITY

## 2024-12-18 ENCOUNTER — OUTPATIENT (OUTPATIENT)
Dept: OUTPATIENT SERVICES | Facility: HOSPITAL | Age: 74
LOS: 1 days | End: 2024-12-18
Payer: MEDICARE

## 2024-12-18 VITALS
SYSTOLIC BLOOD PRESSURE: 96 MMHG | OXYGEN SATURATION: 96 % | HEIGHT: 65 IN | HEART RATE: 84 BPM | WEIGHT: 197.98 LBS | RESPIRATION RATE: 18 BRPM | TEMPERATURE: 97 F | DIASTOLIC BLOOD PRESSURE: 78 MMHG

## 2024-12-18 DIAGNOSIS — T14.8XXA OTHER INJURY OF UNSPECIFIED BODY REGION, INITIAL ENCOUNTER: Chronic | ICD-10-CM

## 2024-12-18 DIAGNOSIS — K85.10 BILIARY ACUTE PANCREATITIS WITHOUT NECROSIS OR INFECTION: ICD-10-CM

## 2024-12-18 DIAGNOSIS — K80.00 CALCULUS OF GALLBLADDER WITH ACUTE CHOLECYSTITIS WITHOUT OBSTRUCTION: ICD-10-CM

## 2024-12-18 DIAGNOSIS — Z01.818 ENCOUNTER FOR OTHER PREPROCEDURAL EXAMINATION: ICD-10-CM

## 2024-12-18 DIAGNOSIS — K80.20 CALCULUS OF GALLBLADDER WITHOUT CHOLECYSTITIS WITHOUT OBSTRUCTION: ICD-10-CM

## 2024-12-18 DIAGNOSIS — Z87.821 PERSONAL HISTORY OF RETAINED FOREIGN BODY FULLY REMOVED: Chronic | ICD-10-CM

## 2024-12-18 LAB
ALBUMIN SERPL ELPH-MCNC: 4.1 G/DL — SIGNIFICANT CHANGE UP (ref 3.3–5)
ALP SERPL-CCNC: 121 U/L — HIGH (ref 40–120)
ALT FLD-CCNC: 68 U/L — SIGNIFICANT CHANGE UP (ref 12–78)
ANION GAP SERPL CALC-SCNC: 7 MMOL/L — SIGNIFICANT CHANGE UP (ref 5–17)
AST SERPL-CCNC: 37 U/L — SIGNIFICANT CHANGE UP (ref 15–37)
BILIRUB SERPL-MCNC: 0.6 MG/DL — SIGNIFICANT CHANGE UP (ref 0.2–1.2)
BUN SERPL-MCNC: 13 MG/DL — SIGNIFICANT CHANGE UP (ref 7–23)
CALCIUM SERPL-MCNC: 10.1 MG/DL — SIGNIFICANT CHANGE UP (ref 8.5–10.1)
CHLORIDE SERPL-SCNC: 105 MMOL/L — SIGNIFICANT CHANGE UP (ref 96–108)
CO2 SERPL-SCNC: 26 MMOL/L — SIGNIFICANT CHANGE UP (ref 22–31)
CREAT SERPL-MCNC: 1.2 MG/DL — SIGNIFICANT CHANGE UP (ref 0.5–1.3)
EGFR: 63 ML/MIN/1.73M2 — SIGNIFICANT CHANGE UP
GLUCOSE SERPL-MCNC: 93 MG/DL — SIGNIFICANT CHANGE UP (ref 70–99)
HCT VFR BLD CALC: 48.7 % — SIGNIFICANT CHANGE UP (ref 39–50)
HGB BLD-MCNC: 16.4 G/DL — SIGNIFICANT CHANGE UP (ref 13–17)
LIDOCAIN IGE QN: 169 U/L — HIGH (ref 13–75)
MCHC RBC-ENTMCNC: 29.2 PG — SIGNIFICANT CHANGE UP (ref 27–34)
MCHC RBC-ENTMCNC: 33.7 G/DL — SIGNIFICANT CHANGE UP (ref 32–36)
MCV RBC AUTO: 86.8 FL — SIGNIFICANT CHANGE UP (ref 80–100)
NRBC # BLD: 0 /100 WBCS — SIGNIFICANT CHANGE UP (ref 0–0)
PLATELET # BLD AUTO: 235 K/UL — SIGNIFICANT CHANGE UP (ref 150–400)
POTASSIUM SERPL-MCNC: 4.1 MMOL/L — SIGNIFICANT CHANGE UP (ref 3.5–5.3)
POTASSIUM SERPL-SCNC: 4.1 MMOL/L — SIGNIFICANT CHANGE UP (ref 3.5–5.3)
PROT SERPL-MCNC: 7.8 G/DL — SIGNIFICANT CHANGE UP (ref 6–8.3)
RBC # BLD: 5.61 M/UL — SIGNIFICANT CHANGE UP (ref 4.2–5.8)
RBC # FLD: 14 % — SIGNIFICANT CHANGE UP (ref 10.3–14.5)
SODIUM SERPL-SCNC: 138 MMOL/L — SIGNIFICANT CHANGE UP (ref 135–145)
WBC # BLD: 4.06 K/UL — SIGNIFICANT CHANGE UP (ref 3.8–10.5)
WBC # FLD AUTO: 4.06 K/UL — SIGNIFICANT CHANGE UP (ref 3.8–10.5)

## 2024-12-18 PROCEDURE — 83690 ASSAY OF LIPASE: CPT

## 2024-12-18 PROCEDURE — 86850 RBC ANTIBODY SCREEN: CPT

## 2024-12-18 PROCEDURE — 36415 COLL VENOUS BLD VENIPUNCTURE: CPT

## 2024-12-18 PROCEDURE — 85027 COMPLETE CBC AUTOMATED: CPT

## 2024-12-18 PROCEDURE — 93005 ELECTROCARDIOGRAM TRACING: CPT

## 2024-12-18 PROCEDURE — 80053 COMPREHEN METABOLIC PANEL: CPT

## 2024-12-18 PROCEDURE — 86901 BLOOD TYPING SEROLOGIC RH(D): CPT

## 2024-12-18 PROCEDURE — 86900 BLOOD TYPING SEROLOGIC ABO: CPT

## 2024-12-18 PROCEDURE — 93010 ELECTROCARDIOGRAM REPORT: CPT

## 2024-12-18 PROCEDURE — G0463: CPT

## 2024-12-18 NOTE — H&P PST ADULT - HISTORY OF PRESENT ILLNESS
This is a 74 year old alert and oriented male with a PMH significant for Hypertension here today for pre surgical testing.   He is scheduled for Robotic assisted Laparoscopic Cholecystectomy with Dr. Barrett on 12/30/2024.    Reports having abdominal pain while in [place] and US done showing stones in gallbladder and after discussing with the surgeon agreed to have the above mentioned surgery.    Denies any nausea or vomiting at this time.

## 2024-12-18 NOTE — H&P PST ADULT - ALLERGIC/IMMUNOLOGIC
Problem: Goal Outcome Summary  Goal: Goal Outcome Summary  Outcome: Improving            VS:    Stable.   Output:    Voiding adequate amount to the bedpan and uses brief for occasional incontinent.   Activity:    Pt have not gotten up this shift. Repositioned in bed.    Skin: Intact except left hip incision.   Pain:    Pain well managed with prn tylenol and topical cream on right and left shoulder.   CMS:    Intact denies N/T.   Dressing:    L hip incisional dressing is intact with no more drainage from marked area.   Diet:    On regular diet and tolerated well.   LDA:        Equipment:    FWW.   Plan:    Plan to go to TCU when available.   Additional Info:                       negative

## 2024-12-18 NOTE — H&P PST ADULT - PROBLEM SELECTOR PLAN 3
* Following labs ordered-            CBC, CMP, Type and screen,Lipase.  * Following diagnostics ordered-            EKG.  * Medical clearance with Dr. Sanchez on 12/23/2024 as requested by surgeon.  *Pre Op instructions given, . Verbalized understanding.            - Stop any herbal remedies, vitamin supplements  at least 7 days before surgery.           - Stop medication that contains Aspirin/Ibuprofen/Advil/Motrin/Aleve at least 7 days before surgery.           - Leave all valuables at home, No lotion on the day of surgery.           - No illegal drugs for 7 days prior to surgery and no alcohol 24 hrs before procedure.  * Please take the following meds on the day of surgery with sips of water- Protonix and enalapril  * Hibiclens scrub explained and provided.

## 2024-12-18 NOTE — H&P PST ADULT - ANESTHESIA, PREVIOUS REACTION, PROFILE
January 9, 2020       Meredith Espinoza MD  [address] [place] [id number]  VIA Facsimile: [phone]      Patient: Bart Chavez   YOB: 1961   Date of Visit: 1/9/2020       Dear Dr. Espinoza:    Thank you for referring Bart Chavez to me for evaluation. Below are my notes for this visit with him.    If you have questions, please do not hesitate to call me. I look forward to following your patient along with you.      Sincerely,        Evangelist Norton MD        CC: No Recipients  Evangelist Norton MD  1/9/2020  2:30 PM  Sign when Signing Visit                    [facility]    PCP: Meredith Espinoza MD    Chief Complaint   Patient presents with   • Cardiac Clearance     Surgery planned  for right  CEA  1/ 13; abnormal stress test            [This note used Dragon technology. Transcription errors are not uncommon and may not have been corrected prior to electronically signing the note. Should you find these errors, please consult the clinician for interpretation (or apply common sense adjustment when safe and appropriate).]       HPI  Bart Chavez is a 58 year old White male here today.  Patient presents for an urgent visit for preoperative assessment.  Patient is scheduled for right carotid endarterectomy with Dr. Ovalle at [place] in 4 days.  We were asked to see him from preoperative assessment.  We had an opening here.  He is closer however to [place] office.  He denies any chest pain dyspnea or palpitations.  He has a nuclear stress test which poor is suggestive of diaphragmatic attenuation.  Normal LV function of 60%.    Past Medical History  Past Medical History:   Diagnosis Date   • Carotid artery occlusion    • CVA (cerebral vascular accident) (CMS/HCC) 2013   • CVA (cerebral vascular accident) (CMS/HCC) 2012    on plavix   • Essential (primary) hypertension    • Hyperlipoproteinemia        Past Surgical History  Past Surgical History:   Procedure Laterality Date      • Back surgery     • Knee arthroplasty     • Rotator cuff repair         Family History  No family history on file.    Social History  Social History     Tobacco Use   • Smoking status: Current Every Day Smoker     Packs/day: 0.00     Start date: 10/1976   • Smokeless tobacco: Never Used   Substance Use Topics   • Alcohol use: Yes   • Drug use: Never        Allergies  ALLERGIES:  No Known Allergies    Presenting Medications  Current Medications    ASPIRIN 81 MG TABLET    Take 81 mg by mouth daily.    CLOPIDOGREL (PLAVIX) 75 MG TABLET    Take 75 mg by mouth daily.    LISINOPRIL-HYDROCHLOROTHIAZIDE (PRINZIDE,ZESTORETIC) 20-25 MG PER TABLET    Take 1 tablet by mouth daily.    SIMVASTATIN (ZOCOR) 10 MG TABLET    Take 10 mg by mouth nightly.       Review of Systems  Review of Systems   Constitution: Negative for chills, fever, weight gain and weight loss.   HENT: Negative for hearing loss.    Cardiovascular: Negative for chest pain, claudication, dyspnea on exertion, palpitations, paroxysmal nocturnal dyspnea and syncope.   Respiratory: Negative for cough, hemoptysis and shortness of breath.    Endocrine: Negative.    Hematologic/Lymphatic: Does not bruise/bleed easily.   Skin: Negative for rash and suspicious lesions.   Musculoskeletal: Negative for arthritis.   Gastrointestinal: Negative for abdominal pain, hematochezia and melena.   Genitourinary: Negative for dysuria and hematuria.   Neurological: Negative for dizziness and light-headedness.   Psychiatric/Behavioral: Negative.    Allergic/Immunologic: Negative for environmental allergies.       Physical Exam  Visit Vitals  /90 (BP Location: RUE - Right upper extremity, Patient Position: Sitting, Cuff Size: Large Adult)   Pulse 72   Ht 5' 2\" (1.575 m)   Wt 82.6 kg (182 lb)   BMI 33.29 kg/m²     Vital signs were reviewed today.    Physical Exam   Constitutional: He appears healthy. No distress.   Vital signs reviewed   HENT:   No jvp   Eyes: Conjunctivae are  normal.   Neck: Normal range of motion. Neck supple.   Cardiovascular: Normal rate, regular rhythm, S1 normal, S2 normal, normal heart sounds, intact distal pulses and normal pulses.   No murmur heard.  Pulmonary/Chest: Effort normal and breath sounds normal. He exhibits no tenderness.   Abdominal: Soft. Bowel sounds are normal. He exhibits no distension. Musculoskeletal: Normal range of motion.     Neurological: He is alert and oriented to person, place, and time. He has normal motor skills. Gait normal.   Skin: No rash noted. No cyanosis. Nails show no clubbing.       Recent Labs    CBC normal CMP normal A1c 5.7 cholesterol 116 LDL 52 HDL 41      Assessment / Plan  1. Carotid stenosis, bilateral    2. Pre-op evaluation      1.  Bilateral carotid stenosis: Plan for right carotid endarterectomy with Dr. Ovalle at [place] in 4 days.  Nuclear stress test reviewed.  Suggestive of diaphragmatic attenuation with normal wall motion and LV function.  Patient may proceed without cardiac contraindication   2.  History of CVA: On aspirin Plavix and statin  3.  Dyslipidemia: LDL goal of 70  4.  Hypertension: On Zestoretic  5.  Patient should follow-up with one of our partners at the [place] office after carotid endarterectomy     Evangelist Norton M.D.              none

## 2024-12-18 NOTE — H&P PST ADULT - ASSESSMENT
This is a 74 year old alert and oriented male with a PMH significant for Hypertension here today for pre surgical testing.   He is scheduled for Robotic assisted Laparoscopic Cholecystectomy with Dr. Barrett on 12/30/2024

## 2024-12-18 NOTE — H&P PST ADULT - NS HP PST LATEX ALLERGY
[FreeTextEntry3] : Recommendation: He will follow up here with the duplex scan of the penis, and I explained injection therapy to him in detail. All medical record entries made by Cinthya Sylvester (Scrib) were at my, Dr. Ochoa, direction and personally dictated by me on 11/27/2024. I have reviewed the chart and agree that the record accurately reflects my personal performance of the history, physical exam, assessment, and plan. I have also personally directed, reviewed, and agreed with the chart. No

## 2024-12-27 NOTE — ASU PATIENT PROFILE, ADULT - FALL HARM RISK - UNIVERSAL INTERVENTIONS
Bed in lowest position, wheels locked, appropriate side rails in place/Call bell, personal items and telephone in reach/Instruct patient to call for assistance before getting out of bed or chair/Non-slip footwear when patient is out of bed/[place] to call system/Physically safe environment - no spills, clutter or unnecessary equipment/Purposeful Proactive Rounding/Room/bathroom lighting operational, light cord in reach

## 2024-12-30 ENCOUNTER — TRANSCRIPTION ENCOUNTER (OUTPATIENT)
Age: 74
End: 2024-12-30

## 2024-12-30 ENCOUNTER — APPOINTMENT (OUTPATIENT)
Dept: SURGERY | Facility: HOSPITAL | Age: 74
End: 2024-12-30

## 2024-12-30 ENCOUNTER — OUTPATIENT (OUTPATIENT)
Dept: OUTPATIENT SERVICES | Facility: HOSPITAL | Age: 74
LOS: 1 days | End: 2024-12-30
Payer: MEDICARE

## 2024-12-30 ENCOUNTER — RESULT REVIEW (OUTPATIENT)
Age: 74
End: 2024-12-30

## 2024-12-30 VITALS
HEIGHT: 65 IN | TEMPERATURE: 98 F | OXYGEN SATURATION: 98 % | RESPIRATION RATE: 14 BRPM | HEART RATE: 66 BPM | WEIGHT: 197.98 LBS | SYSTOLIC BLOOD PRESSURE: 139 MMHG | DIASTOLIC BLOOD PRESSURE: 83 MMHG

## 2024-12-30 VITALS — TEMPERATURE: 98 F

## 2024-12-30 DIAGNOSIS — K85.10 BILIARY ACUTE PANCREATITIS WITHOUT NECROSIS OR INFECTION: ICD-10-CM

## 2024-12-30 DIAGNOSIS — Z87.821 PERSONAL HISTORY OF RETAINED FOREIGN BODY FULLY REMOVED: Chronic | ICD-10-CM

## 2024-12-30 DIAGNOSIS — K80.00 CALCULUS OF GALLBLADDER WITH ACUTE CHOLECYSTITIS WITHOUT OBSTRUCTION: ICD-10-CM

## 2024-12-30 PROCEDURE — 47562 LAPAROSCOPIC CHOLECYSTECTOMY: CPT

## 2024-12-30 PROCEDURE — C1889: CPT

## 2024-12-30 PROCEDURE — 88304 TISSUE EXAM BY PATHOLOGIST: CPT | Mod: 26

## 2024-12-30 PROCEDURE — 88304 TISSUE EXAM BY PATHOLOGIST: CPT

## 2024-12-30 PROCEDURE — S2900: CPT

## 2024-12-30 PROCEDURE — 47563 LAPARO CHOLECYSTECTOMY/GRAPH: CPT

## 2024-12-30 PROCEDURE — S2900 ROBOTIC SURGICAL SYSTEM: CPT | Mod: NC

## 2024-12-30 DEVICE — LIGATING CLIPS WECK HEMOLOK POLYMER MEDIUM-LARGE (GREEN) 6: Type: IMPLANTABLE DEVICE | Status: FUNCTIONAL

## 2024-12-30 RX ORDER — OXYCODONE HCL 15 MG
1 TABLET ORAL
Qty: 6 | Refills: 0
Start: 2024-12-30

## 2024-12-30 RX ORDER — ONDANSETRON 4 MG/1
4 TABLET ORAL ONCE
Refills: 0 | Status: DISCONTINUED | OUTPATIENT
Start: 2024-12-30 | End: 2024-12-31

## 2024-12-30 RX ORDER — LIPASE/PROTEASE/AMYLASE 8K-30K-30K
0 TABLET ORAL
Refills: 0 | DISCHARGE

## 2024-12-30 RX ORDER — INDOCYANINE GREEN 25 MG
5 KIT INTRAVENOUS ONCE
Refills: 0 | Status: COMPLETED | OUTPATIENT
Start: 2024-12-30 | End: 2024-12-30

## 2024-12-30 RX ORDER — ACETAMINOPHEN 80 MG/.8ML
1000 SOLUTION/ DROPS ORAL ONCE
Refills: 0 | Status: COMPLETED | OUTPATIENT
Start: 2024-12-30 | End: 2024-12-30

## 2024-12-30 RX ORDER — HYDROMORPHONE HCL 4 MG
0.5 TABLET ORAL
Refills: 0 | Status: DISCONTINUED | OUTPATIENT
Start: 2024-12-30 | End: 2024-12-31

## 2024-12-30 RX ORDER — CEFAZOLIN SODIUM 1 G
2000 VIAL (EA) INJECTION ONCE
Refills: 0 | Status: COMPLETED | OUTPATIENT
Start: 2024-12-30 | End: 2024-12-30

## 2024-12-30 RX ORDER — SODIUM CHLORIDE 9 MG/ML
1000 INJECTION, SOLUTION INTRAVENOUS
Refills: 0 | Status: DISCONTINUED | OUTPATIENT
Start: 2024-12-30 | End: 2024-12-30

## 2024-12-30 RX ORDER — HYDROMORPHONE HCL 4 MG
1 TABLET ORAL
Refills: 0 | Status: DISCONTINUED | OUTPATIENT
Start: 2024-12-30 | End: 2024-12-31

## 2024-12-30 RX ORDER — SODIUM CHLORIDE 9 MG/ML
1000 INJECTION, SOLUTION INTRAVENOUS
Refills: 0 | Status: DISCONTINUED | OUTPATIENT
Start: 2024-12-30 | End: 2024-12-31

## 2024-12-30 RX ORDER — ACETAMINOPHEN 80 MG/.8ML
2 SOLUTION/ DROPS ORAL
Qty: 40 | Refills: 0
Start: 2024-12-30 | End: 2025-01-03

## 2024-12-30 RX ORDER — IBUPROFEN 200 MG
1 TABLET ORAL
Qty: 20 | Refills: 0
Start: 2024-12-30 | End: 2025-01-03

## 2024-12-30 RX ORDER — ALUMINUM ZIRCONIUM TRICHLOROHYDREX GLY 0.2 G/G
1 STICK TOPICAL
Refills: 0 | DISCHARGE

## 2024-12-30 RX ADMIN — INDOCYANINE GREEN 5 MILLIGRAM(S): KIT INTRAVENOUS at 08:21

## 2024-12-30 RX ADMIN — ACETAMINOPHEN 400 MILLIGRAM(S): 80 SOLUTION/ DROPS ORAL at 12:33

## 2024-12-30 RX ADMIN — SODIUM CHLORIDE 75 MILLILITER(S): 9 INJECTION, SOLUTION INTRAVENOUS at 08:21

## 2024-12-30 RX ADMIN — ACETAMINOPHEN 1000 MILLIGRAM(S): 80 SOLUTION/ DROPS ORAL at 13:03

## 2024-12-30 NOTE — BRIEF OPERATIVE NOTE - NSICDXBRIEFPREOP_GEN_ALL_CORE_FT
PRE-OP DIAGNOSIS:  Calculus of bile duct without cholangitis or cholecystitis 30-Dec-2024 12:02:51  Jensen Colbert

## 2024-12-30 NOTE — BRIEF OPERATIVE NOTE - NSICDXBRIEFPOSTOP_GEN_ALL_CORE_FT
POST-OP DIAGNOSIS:  Calculus of bile duct without cholangitis or cholecystitis 30-Dec-2024 12:03:02  Jensen Colbert

## 2024-12-30 NOTE — ASU DISCHARGE PLAN (ADULT/PEDIATRIC) - ASU DC SPECIAL INSTRUCTIONSFT
Keep wounds clean and dry, leave wound glue in place.  You may shower in 24 hours.  Do not let water hit wounds directly, do not rub or pick at incisions.  Pat wounds dry with a towel after showering.     No heavy lifting (nothing heavier than 5 lbs.), no strenuous physical activity, no exercise.      You may perform your usual daily tasks as tolerated.    Eat a low-fat diet for the next few weeks.            Do not drive for 24 hours following anesthesia.  Do not drive while taking narcotic pain meds.  Do not drive until pain-free.      For post-operative pain:  1. Take Tylenol Extra-Strength over the counter 500mg pills - take 2 every six hours regularly for the first three days after surgery, then as needed for pain.       2. Take 600mg prescription ibuprofen - take 1 pill every 6 hours regularly for the first three days after surgery (take with food), then as needed for pain.     3. Take oxycodone as prescribed if you still have pain despite the Tylenol and Motrin combination.      4. Take colace while taking oxycodone to prevent constipation.    All 4 medications can be taken together.    With regard to your regular home medications - resume them as instructed in the discharge medication reconciliation.    You are encouraged to walk as much as possible to prevent blood clots in the legs, to maintain lung health after surgery/anesthesia, and to prevent constipation after surgery.      Continue to use the incentive spirometer at home.    Follow-up with Dr. Barrett in his office next week - call office for appointment if not already made.

## 2024-12-30 NOTE — ASU DISCHARGE PLAN (ADULT/PEDIATRIC) - FINANCIAL ASSISTANCE
[facility] provides services at a reduced cost to those who are determined to be eligible through [facility]’s financial assistance program. Information regarding [facility]’s financial assistance program can be found by going to [email or web address] or by calling [phone].

## 2024-12-30 NOTE — ASU DISCHARGE PLAN (ADULT/PEDIATRIC) - C. MAKE IMPORTANT PERSONAL OR BUSINESS DECISIONS
Continuation of dupixent therapy per Dr. Harris's 3/21/23 notes: On dupixent since 11/2023   Pt has follow up scheduled with Randolph Harris on 6/18. Will close encounter.    Statement Selected

## 2024-12-30 NOTE — BRIEF OPERATIVE NOTE - COMMENTS
IJensen PA-C provided direct first assist support to the surgeon during this surgical procedure. My involvement included positioning, prepping and draping the patient prior to surgery, robotic port placement, robotic docking, robotic instrument insertion and removal, ensuring clear visibility and exposure for the surgeon by using instruments such as retractors, suction and sponges, retrieving specimens from the operative field, closing surgical incisions, undocking the robot and dressing wounds.  As well as other tasks as directed by the surgeon.

## 2024-12-31 LAB — SURGICAL PATHOLOGY STUDY: SIGNIFICANT CHANGE UP

## 2025-01-07 ENCOUNTER — APPOINTMENT (OUTPATIENT)
Dept: SURGERY | Facility: CLINIC | Age: 75
End: 2025-01-07
Payer: MEDICARE

## 2025-01-07 VITALS
TEMPERATURE: 98.2 F | SYSTOLIC BLOOD PRESSURE: 138 MMHG | HEIGHT: 67 IN | DIASTOLIC BLOOD PRESSURE: 90 MMHG | WEIGHT: 200 LBS | BODY MASS INDEX: 31.39 KG/M2 | HEART RATE: 83 BPM | OXYGEN SATURATION: 94 %

## 2025-01-07 DIAGNOSIS — K80.10 CALCULUS OF GALLBLADDER WITH CHRONIC CHOLECYSTITIS W/OUT OBSTRUCTION: ICD-10-CM

## 2025-01-07 DIAGNOSIS — Z09 ENCOUNTER FOR FOLLOW-UP EXAMINATION AFTER COMPLETED TREATMENT FOR CONDITIONS OTHER THAN MALIGNANT NEOPLASM: ICD-10-CM

## 2025-01-07 PROCEDURE — 99024 POSTOP FOLLOW-UP VISIT: CPT

## (undated) DEVICE — XI DRAPE ARM

## (undated) DEVICE — NDL INJ SCLERO INTERJECT 23G

## (undated) DEVICE — CATH ELCTR GLIDE PRB 7FR

## (undated) DEVICE — DRSG STERISTRIPS 0.5 X 4"

## (undated) DEVICE — NDL HYPO REGULAR BEVEL 25G X 1.5" (BLUE)

## (undated) DEVICE — SUCTION YANKAUER TAPERED BULBOUS NO VENT

## (undated) DEVICE — DRSG CURITY GAUZE SPONGE 4 X 4" 12-PLY

## (undated) DEVICE — DRAPE 3/4 SHEET W REINFORCEMENT 56X77"

## (undated) DEVICE — BITE BLOCK MAXI RUBBER STAMP

## (undated) DEVICE — DRSG OPSITE 2.5 X 2"

## (undated) DEVICE — ELCTR BOVIE TIP BLADE INSULATED 2.75" EDGE

## (undated) DEVICE — SET IV PUMP BLOOD 1VALVE 180FILTER NON-DEHP

## (undated) DEVICE — XI CANNULA SEAL 5MM - 8 MM

## (undated) DEVICE — SOL IRR BAG H2O 1000ML

## (undated) DEVICE — DRAPE TOWEL BLUE 17" X 24"

## (undated) DEVICE — PLV-SCD MACHINE: Type: DURABLE MEDICAL EQUIPMENT

## (undated) DEVICE — TUBING IV SET SECOND 34" W/O LOK-BLUNT

## (undated) DEVICE — MARKER ENDO SPOT EX

## (undated) DEVICE — D HELP - CLEARVIEW CLEARIFY SYSTEM

## (undated) DEVICE — SYR LUER LOK 30CC

## (undated) DEVICE — XI ENDOWRIST 12 - 8 MM CANNULA REDUCER

## (undated) DEVICE — WARMING BLANKET UPPER ADULT

## (undated) DEVICE — FORCEP RADIAL JAW 4 W NDL 2.2MM 2.8MM 160CM YELLOW DISP

## (undated) DEVICE — PLV/PSP-SUCTION IRRIGATOR STRYKER: Type: DURABLE MEDICAL EQUIPMENT

## (undated) DEVICE — TROCAR COVIDIEN VERSAONE BLUNT TIP HASSAN 12MM

## (undated) DEVICE — BLADE SCALPEL SAFETYLOCK #15

## (undated) DEVICE — TUBING STRYKER PNEUMOCLEAR HIGH FLOW

## (undated) DEVICE — PLV/PSP-ESU FORCEFX F8J7721A: Type: DURABLE MEDICAL EQUIPMENT

## (undated) DEVICE — SOL IRR POUR NS 0.9% 1000ML

## (undated) DEVICE — CATH IV SAFE BC 20G X 1.16" (PINK)

## (undated) DEVICE — CATH IV SAFE BC 22G X 1" (BLUE)

## (undated) DEVICE — SOL IRR POUR H2O 1000ML

## (undated) DEVICE — SNARE LRG

## (undated) DEVICE — XI OBTURATOR OPTICAL BLADELESS 8MM

## (undated) DEVICE — TUBING IV SET GRAVITY 3Y 100" MACRO

## (undated) DEVICE — ENDOCATCH 10MM

## (undated) DEVICE — MASK LRG MED AND HIGH O2 CONC M TO M 10FT

## (undated) DEVICE — XI DRAPE COLUMN

## (undated) DEVICE — XI ENDOWRIST SUCTION IRRIGATOR 8MM

## (undated) DEVICE — ELCTR GROUNDING PAD ADULT COVIDIEN

## (undated) DEVICE — GLV 7.5 PROTEXIS (WHITE)

## (undated) DEVICE — BRUSH CYTO ENDO

## (undated) DEVICE — CATH ELECHMSTAT  INJ 7FR 210CM

## (undated) DEVICE — GLV 7 PROTEXIS (WHITE)

## (undated) DEVICE — TUBING SUCTION CONN 6FT STERILE

## (undated) DEVICE — XI CORD BIPOLAR CAUTERY (BLUE)

## (undated) DEVICE — XI CORD MONOPOLAR CAUTERY (GREEN)

## (undated) DEVICE — WARMING BLANKET LOWER ADULT

## (undated) DEVICE — SUT MONOCRYL 4-0 27" PS-2 UNDYED

## (undated) DEVICE — PACK GENERAL LAPAROSCOPY

## (undated) DEVICE — VENODYNE/SCD SLEEVE CALF MEDIUM

## (undated) DEVICE — TUBE O2 SUPL CRUSH RESIS CONN SOUTHSIDE ONLY

## (undated) DEVICE — XI 12MM AND STAPLER CANNULA SEAL

## (undated) DEVICE — SMOKE EVACUTATION SYS LAPROSCOPIC AC/PA

## (undated) DEVICE — SOL IRR BAG NS 0.9% 1000ML

## (undated) DEVICE — SUT POLYSORB 0 30" GU-45

## (undated) DEVICE — VENODYNE/SCD SLEEVE CALF LARGE

## (undated) DEVICE — SYR IV POSIFLUSH NS 3ML 30/TY